# Patient Record
Sex: FEMALE | Race: WHITE | URBAN - METROPOLITAN AREA
[De-identification: names, ages, dates, MRNs, and addresses within clinical notes are randomized per-mention and may not be internally consistent; named-entity substitution may affect disease eponyms.]

---

## 2023-03-21 ENCOUNTER — ANESTHESIA EVENT (OUTPATIENT)
Dept: ENDOSCOPY | Age: 28
End: 2023-03-21
Payer: MEDICAID

## 2023-03-21 ENCOUNTER — HOSPITAL ENCOUNTER (INPATIENT)
Age: 28
LOS: 4 days | Discharge: HOME | DRG: 263 | End: 2023-03-25
Attending: STUDENT IN AN ORGANIZED HEALTH CARE EDUCATION/TRAINING PROGRAM | Admitting: STUDENT IN AN ORGANIZED HEALTH CARE EDUCATION/TRAINING PROGRAM
Payer: MEDICAID

## 2023-03-21 ENCOUNTER — APPOINTMENT (OUTPATIENT)
Dept: CT IMAGING | Age: 28
DRG: 263 | End: 2023-03-21
Attending: STUDENT IN AN ORGANIZED HEALTH CARE EDUCATION/TRAINING PROGRAM
Payer: MEDICAID

## 2023-03-21 ENCOUNTER — ANESTHESIA (OUTPATIENT)
Dept: ENDOSCOPY | Age: 28
End: 2023-03-21
Payer: MEDICAID

## 2023-03-21 DIAGNOSIS — K81.0 ACUTE CHOLECYSTITIS: ICD-10-CM

## 2023-03-21 DIAGNOSIS — K83.8 BILIARY SLUDGE: Primary | ICD-10-CM

## 2023-03-21 DIAGNOSIS — K31.1 GASTRIC OUTLET OBSTRUCTION: ICD-10-CM

## 2023-03-21 LAB
ALBUMIN SERPL-MCNC: 3.4 G/DL (ref 3.4–5)
ALP SERPL-CCNC: 62 U/L (ref 40–129)
ALT SERPL-CCNC: 20 U/L (ref 10–40)
ANION GAP SERPL CALCULATED.3IONS-SCNC: 6 MMOL/L (ref 3–16)
AST SERPL-CCNC: 24 U/L (ref 15–37)
BASOPHILS # BLD: 0 K/UL (ref 0–0.2)
BASOPHILS NFR BLD: 0.5 %
BILIRUB DIRECT SERPL-MCNC: <0.2 MG/DL (ref 0–0.3)
BILIRUB INDIRECT SERPL-MCNC: ABNORMAL MG/DL (ref 0–1)
BILIRUB SERPL-MCNC: 0.5 MG/DL (ref 0–1)
BUN SERPL-MCNC: 12 MG/DL (ref 7–20)
CALCIUM SERPL-MCNC: 7.8 MG/DL (ref 8.3–10.6)
CHLORIDE SERPL-SCNC: 109 MMOL/L (ref 99–110)
CO2 SERPL-SCNC: 22 MMOL/L (ref 21–32)
CREAT SERPL-MCNC: 0.6 MG/DL (ref 0.6–1.1)
DEPRECATED RDW RBC AUTO: 13.4 % (ref 12.4–15.4)
EOSINOPHIL # BLD: 0.2 K/UL (ref 0–0.6)
EOSINOPHIL NFR BLD: 4 %
GFR SERPLBLD CREATININE-BSD FMLA CKD-EPI: >60 ML/MIN/{1.73_M2}
GLUCOSE SERPL-MCNC: 87 MG/DL (ref 70–99)
HCG UR QL: NEGATIVE
HCT VFR BLD AUTO: 36.3 % (ref 36–48)
HGB BLD-MCNC: 12.3 G/DL (ref 12–16)
LYMPHOCYTES # BLD: 0.9 K/UL (ref 1–5.1)
LYMPHOCYTES NFR BLD: 18.2 %
MAGNESIUM SERPL-MCNC: 2.1 MG/DL (ref 1.8–2.4)
MCH RBC QN AUTO: 30.7 PG (ref 26–34)
MCHC RBC AUTO-ENTMCNC: 34 G/DL (ref 31–36)
MCV RBC AUTO: 90.3 FL (ref 80–100)
MONOCYTES # BLD: 0.3 K/UL (ref 0–1.3)
MONOCYTES NFR BLD: 7 %
NEUTROPHILS # BLD: 3.5 K/UL (ref 1.7–7.7)
NEUTROPHILS NFR BLD: 70.3 %
PHOSPHATE SERPL-MCNC: 2.6 MG/DL (ref 2.5–4.9)
PLATELET # BLD AUTO: 136 K/UL (ref 135–450)
PMV BLD AUTO: 8.5 FL (ref 5–10.5)
POTASSIUM SERPL-SCNC: 4 MMOL/L (ref 3.5–5.1)
PROT SERPL-MCNC: 5.8 G/DL (ref 6.4–8.2)
RBC # BLD AUTO: 4.02 M/UL (ref 4–5.2)
REASON FOR REJECTION: NORMAL
REJECTED TEST: NORMAL
SODIUM SERPL-SCNC: 137 MMOL/L (ref 136–145)
TSH SERPL DL<=0.005 MIU/L-ACNC: 0.39 UIU/ML (ref 0.27–4.2)
WBC # BLD AUTO: 5 K/UL (ref 4–11)

## 2023-03-21 PROCEDURE — 2500000003 HC RX 250 WO HCPCS: Performed by: NURSE ANESTHETIST, CERTIFIED REGISTERED

## 2023-03-21 PROCEDURE — 6360000002 HC RX W HCPCS: Performed by: STUDENT IN AN ORGANIZED HEALTH CARE EDUCATION/TRAINING PROGRAM

## 2023-03-21 PROCEDURE — 74176 CT ABD & PELVIS W/O CONTRAST: CPT

## 2023-03-21 PROCEDURE — 80069 RENAL FUNCTION PANEL: CPT

## 2023-03-21 PROCEDURE — 0DB58ZX EXCISION OF ESOPHAGUS, VIA NATURAL OR ARTIFICIAL OPENING ENDOSCOPIC, DIAGNOSTIC: ICD-10-PCS | Performed by: INTERNAL MEDICINE

## 2023-03-21 PROCEDURE — 80076 HEPATIC FUNCTION PANEL: CPT

## 2023-03-21 PROCEDURE — 84703 CHORIONIC GONADOTROPIN ASSAY: CPT

## 2023-03-21 PROCEDURE — 88305 TISSUE EXAM BY PATHOLOGIST: CPT

## 2023-03-21 PROCEDURE — 84443 ASSAY THYROID STIM HORMONE: CPT

## 2023-03-21 PROCEDURE — 7100000000 HC PACU RECOVERY - FIRST 15 MIN: Performed by: INTERNAL MEDICINE

## 2023-03-21 PROCEDURE — 2580000003 HC RX 258: Performed by: STUDENT IN AN ORGANIZED HEALTH CARE EDUCATION/TRAINING PROGRAM

## 2023-03-21 PROCEDURE — 6360000002 HC RX W HCPCS: Performed by: NURSE ANESTHETIST, CERTIFIED REGISTERED

## 2023-03-21 PROCEDURE — 1200000000 HC SEMI PRIVATE

## 2023-03-21 PROCEDURE — 2709999900 HC NON-CHARGEABLE SUPPLY: Performed by: INTERNAL MEDICINE

## 2023-03-21 PROCEDURE — 0DB98ZX EXCISION OF DUODENUM, VIA NATURAL OR ARTIFICIAL OPENING ENDOSCOPIC, DIAGNOSTIC: ICD-10-PCS | Performed by: INTERNAL MEDICINE

## 2023-03-21 PROCEDURE — 83735 ASSAY OF MAGNESIUM: CPT

## 2023-03-21 PROCEDURE — 0DB68ZX EXCISION OF STOMACH, VIA NATURAL OR ARTIFICIAL OPENING ENDOSCOPIC, DIAGNOSTIC: ICD-10-PCS | Performed by: INTERNAL MEDICINE

## 2023-03-21 PROCEDURE — 36415 COLL VENOUS BLD VENIPUNCTURE: CPT

## 2023-03-21 PROCEDURE — 6370000000 HC RX 637 (ALT 250 FOR IP): Performed by: INTERNAL MEDICINE

## 2023-03-21 PROCEDURE — C9113 INJ PANTOPRAZOLE SODIUM, VIA: HCPCS | Performed by: STUDENT IN AN ORGANIZED HEALTH CARE EDUCATION/TRAINING PROGRAM

## 2023-03-21 PROCEDURE — 85025 COMPLETE CBC W/AUTO DIFF WBC: CPT

## 2023-03-21 PROCEDURE — 3700000001 HC ADD 15 MINUTES (ANESTHESIA): Performed by: INTERNAL MEDICINE

## 2023-03-21 PROCEDURE — 3609012400 HC EGD TRANSORAL BIOPSY SINGLE/MULTIPLE: Performed by: INTERNAL MEDICINE

## 2023-03-21 PROCEDURE — 7100000001 HC PACU RECOVERY - ADDTL 15 MIN: Performed by: INTERNAL MEDICINE

## 2023-03-21 PROCEDURE — 2580000003 HC RX 258: Performed by: ANESTHESIOLOGY

## 2023-03-21 PROCEDURE — 3700000000 HC ANESTHESIA ATTENDED CARE: Performed by: INTERNAL MEDICINE

## 2023-03-21 RX ORDER — RIMEGEPANT SULFATE 75 MG/75MG
75 TABLET, ORALLY DISINTEGRATING ORAL
COMMUNITY
Start: 2023-02-01

## 2023-03-21 RX ORDER — SODIUM CHLORIDE 0.9 % (FLUSH) 0.9 %
5-40 SYRINGE (ML) INJECTION EVERY 12 HOURS SCHEDULED
Status: DISCONTINUED | OUTPATIENT
Start: 2023-03-21 | End: 2023-03-25 | Stop reason: HOSPADM

## 2023-03-21 RX ORDER — ACETAMINOPHEN 325 MG/1
650 TABLET ORAL EVERY 6 HOURS PRN
Status: DISCONTINUED | OUTPATIENT
Start: 2023-03-21 | End: 2023-03-24

## 2023-03-21 RX ORDER — DEXAMETHASONE SODIUM PHOSPHATE 4 MG/ML
INJECTION, SOLUTION INTRA-ARTICULAR; INTRALESIONAL; INTRAMUSCULAR; INTRAVENOUS; SOFT TISSUE PRN
Status: DISCONTINUED | OUTPATIENT
Start: 2023-03-21 | End: 2023-03-21 | Stop reason: SDUPTHER

## 2023-03-21 RX ORDER — SODIUM CHLORIDE 9 MG/ML
INJECTION, SOLUTION INTRAVENOUS CONTINUOUS
Status: DISCONTINUED | OUTPATIENT
Start: 2023-03-21 | End: 2023-03-21 | Stop reason: HOSPADM

## 2023-03-21 RX ORDER — POTASSIUM CHLORIDE 20 MEQ/1
40 TABLET, EXTENDED RELEASE ORAL PRN
Status: DISCONTINUED | OUTPATIENT
Start: 2023-03-21 | End: 2023-03-25 | Stop reason: HOSPADM

## 2023-03-21 RX ORDER — POTASSIUM CHLORIDE 7.45 MG/ML
10 INJECTION INTRAVENOUS PRN
Status: DISCONTINUED | OUTPATIENT
Start: 2023-03-21 | End: 2023-03-25 | Stop reason: HOSPADM

## 2023-03-21 RX ORDER — PANTOPRAZOLE SODIUM 40 MG/10ML
40 INJECTION, POWDER, LYOPHILIZED, FOR SOLUTION INTRAVENOUS DAILY
Status: DISCONTINUED | OUTPATIENT
Start: 2023-03-21 | End: 2023-03-21

## 2023-03-21 RX ORDER — ROCURONIUM BROMIDE 10 MG/ML
INJECTION, SOLUTION INTRAVENOUS PRN
Status: DISCONTINUED | OUTPATIENT
Start: 2023-03-21 | End: 2023-03-21 | Stop reason: SDUPTHER

## 2023-03-21 RX ORDER — ENOXAPARIN SODIUM 100 MG/ML
40 INJECTION SUBCUTANEOUS NIGHTLY
Status: DISCONTINUED | OUTPATIENT
Start: 2023-03-21 | End: 2023-03-25 | Stop reason: HOSPADM

## 2023-03-21 RX ORDER — ACETAMINOPHEN 650 MG/1
650 SUPPOSITORY RECTAL EVERY 6 HOURS PRN
Status: DISCONTINUED | OUTPATIENT
Start: 2023-03-21 | End: 2023-03-24

## 2023-03-21 RX ORDER — LIDOCAINE HYDROCHLORIDE 20 MG/ML
INJECTION, SOLUTION INFILTRATION; PERINEURAL PRN
Status: DISCONTINUED | OUTPATIENT
Start: 2023-03-21 | End: 2023-03-21 | Stop reason: SDUPTHER

## 2023-03-21 RX ORDER — SODIUM CHLORIDE 9 MG/ML
INJECTION, SOLUTION INTRAVENOUS PRN
Status: DISCONTINUED | OUTPATIENT
Start: 2023-03-21 | End: 2023-03-25 | Stop reason: HOSPADM

## 2023-03-21 RX ORDER — POLYETHYLENE GLYCOL 3350 17 G/17G
17 POWDER, FOR SOLUTION ORAL DAILY PRN
Status: DISCONTINUED | OUTPATIENT
Start: 2023-03-21 | End: 2023-03-22

## 2023-03-21 RX ORDER — PANTOPRAZOLE SODIUM 40 MG/1
40 TABLET, DELAYED RELEASE ORAL
Status: DISCONTINUED | OUTPATIENT
Start: 2023-03-22 | End: 2023-03-25 | Stop reason: HOSPADM

## 2023-03-21 RX ORDER — SODIUM CHLORIDE, SODIUM LACTATE, POTASSIUM CHLORIDE, CALCIUM CHLORIDE 600; 310; 30; 20 MG/100ML; MG/100ML; MG/100ML; MG/100ML
INJECTION, SOLUTION INTRAVENOUS CONTINUOUS
Status: DISCONTINUED | OUTPATIENT
Start: 2023-03-21 | End: 2023-03-25 | Stop reason: HOSPADM

## 2023-03-21 RX ORDER — ONDANSETRON 4 MG/1
4 TABLET, ORALLY DISINTEGRATING ORAL EVERY 8 HOURS PRN
Status: DISCONTINUED | OUTPATIENT
Start: 2023-03-21 | End: 2023-03-25 | Stop reason: HOSPADM

## 2023-03-21 RX ORDER — MAGNESIUM SULFATE IN WATER 40 MG/ML
2000 INJECTION, SOLUTION INTRAVENOUS PRN
Status: DISCONTINUED | OUTPATIENT
Start: 2023-03-21 | End: 2023-03-25 | Stop reason: HOSPADM

## 2023-03-21 RX ORDER — HYDROMORPHONE HYDROCHLORIDE 1 MG/ML
0.5 INJECTION, SOLUTION INTRAMUSCULAR; INTRAVENOUS; SUBCUTANEOUS
Status: DISCONTINUED | OUTPATIENT
Start: 2023-03-21 | End: 2023-03-24

## 2023-03-21 RX ORDER — ONDANSETRON 2 MG/ML
4 INJECTION INTRAMUSCULAR; INTRAVENOUS EVERY 6 HOURS PRN
Status: DISCONTINUED | OUTPATIENT
Start: 2023-03-21 | End: 2023-03-25 | Stop reason: HOSPADM

## 2023-03-21 RX ORDER — SUCCINYLCHOLINE/SOD CL,ISO/PF 200MG/10ML
SYRINGE (ML) INTRAVENOUS PRN
Status: DISCONTINUED | OUTPATIENT
Start: 2023-03-21 | End: 2023-03-21 | Stop reason: SDUPTHER

## 2023-03-21 RX ORDER — SODIUM CHLORIDE 0.9 % (FLUSH) 0.9 %
5-40 SYRINGE (ML) INJECTION PRN
Status: DISCONTINUED | OUTPATIENT
Start: 2023-03-21 | End: 2023-03-25 | Stop reason: HOSPADM

## 2023-03-21 RX ORDER — PROPOFOL 10 MG/ML
INJECTION, EMULSION INTRAVENOUS PRN
Status: DISCONTINUED | OUTPATIENT
Start: 2023-03-21 | End: 2023-03-21 | Stop reason: SDUPTHER

## 2023-03-21 RX ORDER — NICOTINE 21 MG/24HR
1 PATCH, TRANSDERMAL 24 HOURS TRANSDERMAL DAILY
Status: DISCONTINUED | OUTPATIENT
Start: 2023-03-21 | End: 2023-03-25 | Stop reason: HOSPADM

## 2023-03-21 RX ORDER — ONDANSETRON 2 MG/ML
INJECTION INTRAMUSCULAR; INTRAVENOUS PRN
Status: DISCONTINUED | OUTPATIENT
Start: 2023-03-21 | End: 2023-03-21 | Stop reason: SDUPTHER

## 2023-03-21 RX ORDER — METOCLOPRAMIDE 10 MG/1
10 TABLET ORAL 2 TIMES DAILY
Status: DISCONTINUED | OUTPATIENT
Start: 2023-03-21 | End: 2023-03-25 | Stop reason: HOSPADM

## 2023-03-21 RX ORDER — ESMOLOL HYDROCHLORIDE 10 MG/ML
INJECTION INTRAVENOUS PRN
Status: DISCONTINUED | OUTPATIENT
Start: 2023-03-21 | End: 2023-03-21 | Stop reason: SDUPTHER

## 2023-03-21 RX ADMIN — ROCURONIUM BROMIDE 5 MG: 10 INJECTION INTRAVENOUS at 14:55

## 2023-03-21 RX ADMIN — ONDANSETRON 4 MG: 2 INJECTION INTRAMUSCULAR; INTRAVENOUS at 18:08

## 2023-03-21 RX ADMIN — LIDOCAINE HYDROCHLORIDE 100 MG: 20 INJECTION, SOLUTION INFILTRATION; PERINEURAL at 14:55

## 2023-03-21 RX ADMIN — ONDANSETRON 4 MG: 2 INJECTION INTRAMUSCULAR; INTRAVENOUS at 15:05

## 2023-03-21 RX ADMIN — SODIUM CHLORIDE, PRESERVATIVE FREE 10 ML: 5 INJECTION INTRAVENOUS at 20:58

## 2023-03-21 RX ADMIN — SODIUM CHLORIDE, POTASSIUM CHLORIDE, SODIUM LACTATE AND CALCIUM CHLORIDE: 600; 310; 30; 20 INJECTION, SOLUTION INTRAVENOUS at 06:39

## 2023-03-21 RX ADMIN — HYDROMORPHONE HYDROCHLORIDE 0.5 MG: 1 INJECTION, SOLUTION INTRAMUSCULAR; INTRAVENOUS; SUBCUTANEOUS at 17:40

## 2023-03-21 RX ADMIN — PROPOFOL 200 MG: 10 INJECTION, EMULSION INTRAVENOUS at 14:55

## 2023-03-21 RX ADMIN — SODIUM CHLORIDE: 9 INJECTION, SOLUTION INTRAVENOUS at 13:39

## 2023-03-21 RX ADMIN — DEXAMETHASONE SODIUM PHOSPHATE 4 MG: 4 INJECTION, SOLUTION INTRAMUSCULAR; INTRAVENOUS at 15:05

## 2023-03-21 RX ADMIN — ESMOLOL HYDROCHLORIDE 10 MG: 10 INJECTION, SOLUTION INTRAVENOUS at 15:00

## 2023-03-21 RX ADMIN — SODIUM CHLORIDE, PRESERVATIVE FREE 10 ML: 5 INJECTION INTRAVENOUS at 09:45

## 2023-03-21 RX ADMIN — Medication 140 MG: at 14:57

## 2023-03-21 RX ADMIN — HYDROMORPHONE HYDROCHLORIDE 0.5 MG: 1 INJECTION, SOLUTION INTRAMUSCULAR; INTRAVENOUS; SUBCUTANEOUS at 09:56

## 2023-03-21 RX ADMIN — SODIUM CHLORIDE, PRESERVATIVE FREE 10 ML: 5 INJECTION INTRAVENOUS at 06:38

## 2023-03-21 RX ADMIN — HYDROMORPHONE HYDROCHLORIDE 0.5 MG: 1 INJECTION, SOLUTION INTRAMUSCULAR; INTRAVENOUS; SUBCUTANEOUS at 20:58

## 2023-03-21 RX ADMIN — PANTOPRAZOLE SODIUM 40 MG: 40 INJECTION, POWDER, FOR SOLUTION INTRAVENOUS at 09:44

## 2023-03-21 RX ADMIN — ESMOLOL HYDROCHLORIDE 10 MG: 10 INJECTION, SOLUTION INTRAVENOUS at 14:55

## 2023-03-21 RX ADMIN — METOCLOPRAMIDE 10 MG: 10 TABLET ORAL at 20:57

## 2023-03-21 RX ADMIN — ENOXAPARIN SODIUM 40 MG: 100 INJECTION SUBCUTANEOUS at 20:57

## 2023-03-21 RX ADMIN — HYDROMORPHONE HYDROCHLORIDE 0.5 MG: 1 INJECTION, SOLUTION INTRAMUSCULAR; INTRAVENOUS; SUBCUTANEOUS at 06:46

## 2023-03-21 ASSESSMENT — PAIN SCALES - GENERAL
PAINLEVEL_OUTOF10: 8
PAINLEVEL_OUTOF10: 8
PAINLEVEL_OUTOF10: 7
PAINLEVEL_OUTOF10: 0
PAINLEVEL_OUTOF10: 7
PAINLEVEL_OUTOF10: 8

## 2023-03-21 ASSESSMENT — PAIN DESCRIPTION - LOCATION
LOCATION: ABDOMEN

## 2023-03-21 ASSESSMENT — PAIN DESCRIPTION - DESCRIPTORS
DESCRIPTORS: DISCOMFORT;CRAMPING
DESCRIPTORS: CRAMPING
DESCRIPTORS: CRAMPING;STABBING
DESCRIPTORS: CRAMPING

## 2023-03-21 ASSESSMENT — PAIN - FUNCTIONAL ASSESSMENT
PAIN_FUNCTIONAL_ASSESSMENT: 0-10
PAIN_FUNCTIONAL_ASSESSMENT: ACTIVITIES ARE NOT PREVENTED
PAIN_FUNCTIONAL_ASSESSMENT: PREVENTS OR INTERFERES SOME ACTIVE ACTIVITIES AND ADLS

## 2023-03-21 ASSESSMENT — PAIN DESCRIPTION - ORIENTATION
ORIENTATION: LOWER
ORIENTATION: RIGHT;LEFT;MID
ORIENTATION: LEFT;LOWER

## 2023-03-21 ASSESSMENT — LIFESTYLE VARIABLES: SMOKING_STATUS: 1

## 2023-03-21 NOTE — H&P
History:      TOBACCO:   reports that she has been smoking. She has been smoking an average of .25 packs per day. She does not have any smokeless tobacco history on file. ETOH:   reports no history of alcohol use. E-cigarette/Vaping       Questions Responses    E-cigarette/Vaping Use     Start Date     Passive Exposure     Quit Date     Counseling Given     Comments               Family History:      Reviewed and negative in regards to presenting illness/complaint. No family history on file. REVIEW OF SYSTEMS COMPLETED:   Pertinent positives as noted in the HPI. All other systems reviewed and negative. PHYSICAL EXAM PERFORMED:    /78   Pulse 83   Temp 97.3 °F (36.3 °C) (Oral)   Resp 16   SpO2 95%     General appearance:  No apparent distress, appears stated age and cooperative. HEENT:  Normal cephalic, atraumatic without obvious deformity. Pupils equal, round, and reactive to light. Extra ocular muscles intact. Conjunctivae/corneas clear. Neck: Supple, with full range of motion. No jugular venous distention. Trachea midline. Respiratory:  Normal respiratory effort. Clear to auscultation, bilaterally without Rales/Wheezes/Rhonchi. Cardiovascular:  Regular rate and rhythm with normal S1/S2 without murmurs, rubs or gallops. No peripheral edema. Abdomen: Soft, non-distended with normal bowel sounds. Moderate tenderness to palpation diffusely. : No CVA tenderness  Musculoskeletal:  No clubbing or cyanosis. Full range of motion without deformity. Skin: Skin color, texture, turgor normal.  No rashes or lesions. Neurologic:  Neurovascularly intact without any focal sensory/motor deficits.  Cranial nerves: II-XII intact, grossly non-focal.  Psychiatric:  Alert and oriented, thought content appropriate, normal insight  Capillary Refill: Brisk, 3 seconds, normal   Peripheral Pulses: +2 palpable, equal bilaterally       Labs:     No results for input(s): WBC, HGB, HCT, PLT in the last 72 acid (EFFER-K) effervescent tablet 40 mEq  40 mEq Oral PRN Everlena Nikki, DO        Or    potassium chloride 10 mEq/100 mL IVPB (Peripheral Line)  10 mEq IntraVENous PRN Everlena Nikki, DO           Consults:    IP CONSULT TO GI    ASSESSMENT:    Active Hospital Problems    Diagnosis Date Noted    Gastric outlet obstruction [K31.1] 03/21/2023     Priority: Medium         PLAN:  This is a 32 y.o. female who presented to [facility] and is being treated for:    Gastric outlet obstruction  -Imaging from [facility] ED indicated gastric outlet obstruction  -CT abdomen pelvis ordered  -Labs ordered  -IVF  -N.p.o.  -Defer NG placement to GI  -Pain control with IV pain meds  -GI consulted; appreciate recommendations        DVT ppx: Lovenox  GI ppx: PPI  Diet: Diet NPO  Code Status: Full Code    PT/OT Eval Status: Not ordered    Disposition - home after medical stabilization and treatment       Guillermo Jordan DO  3/21/2023  6:18 AM

## 2023-03-21 NOTE — H&P
Gastroenterology Note             Pre-operative History and Physical    Patient: Avery Black  : 1995  CSN:     History Obtained From:  patient and/or guardian. HISTORY OF PRESENT ILLNESS:    The patient is a 32 y.o. female  here for EGD  Very pleasant 18-year-old female who was sent in today from [facility] she had a CT scan which showed she had a dilated stomach with food and liquid in it redoing an upper endoscopy to find out if she has a gastric outlet obstruction    Past Medical History:    History reviewed. No pertinent past medical history. Past Surgical History:    Past Surgical History:   Procedure Laterality Date    CYST REMOVAL      from eye lid    DILATION AND CURETTAGE OF UTERUS  3/21/12     Medications Prior to Admission:   No current facility-administered medications on file prior to encounter. Current Outpatient Medications on File Prior to Encounter   Medication Sig Dispense Refill    NURTEC 75 MG TBDP Take 75 mg by mouth Pt reports taking this as needed. Last time she took it was about 1 month ago. Allergies:  Morphine, Cephalexin, Ibuprofen, and Pcn [penicillins]      Social History:   Social History     Tobacco Use    Smoking status: Every Day     Packs/day: 0.25     Types: Cigarettes    Smokeless tobacco: Not on file   Substance Use Topics    Alcohol use: No     Family History:   History reviewed. No pertinent family history. PHYSICAL EXAM:      /83   Pulse 91   Temp 97 °F (36.1 °C) (Temporal)   Resp 18   Ht 5' 2\" (1.575 m)   Wt 173 lb (78.5 kg)   LMP 2023 (Approximate)   SpO2 98%   BMI 31.64 kg/m²  I        Heart:   RRR, normal s1s2    Lungs:  CTA bilat,  Normal effort    Abdomen:   NT, ND      ASA Grade:  ASA 2 - Patient with mild systemic disease with no functional limitations    Mallampati Class: 2          ASSESSMENT AND PLAN:    1.   Patient is a 32 y.o. female here for EGD with MAC.   2.  Procedure options, risks and benefits reviewed with patient. Patient expresses understanding.     Ирина Zuñiga MD,   [address]  3/21/2023

## 2023-03-21 NOTE — CARE COORDINATION
Discharge Planning Note:    Chart reviewed and it appears that patient has minimal needs for discharge at this time. Discussed with patient and requested that case management be notified if discharge needs are identified.     - Current discharge plan is for the patient to return home. Case management will continue to follow progress and update discharge plan as needed.       Risk of Readmission Score: 5%    RUPESH Mclaughlin RN    [facility]  Phone: [phone]

## 2023-03-21 NOTE — PROGRESS NOTES
Morning assessment completed. Pt comfortably resting in bed. VSS. Morning meds given per MAR. The care plan and education has been reviewed and mutually agreed upon with the patient. Pt complain of pain, prn dilaudid given.

## 2023-03-21 NOTE — ANESTHESIA PRE PROCEDURE
4.02 03/21/2023 06:33 AM    HGB 12.3 03/21/2023 06:33 AM    HCT 36.3 03/21/2023 06:33 AM    MCV 90.3 03/21/2023 06:33 AM    RDW 13.4 03/21/2023 06:33 AM     03/21/2023 06:33 AM       CMP:   Lab Results   Component Value Date/Time     03/21/2023 06:33 AM    K 4.0 03/21/2023 06:33 AM     03/21/2023 06:33 AM    CO2 22 03/21/2023 06:33 AM    BUN 12 03/21/2023 06:33 AM    CREATININE 0.6 03/21/2023 06:33 AM    GFRAA >60 03/02/2012 04:09 PM    AGRATIO 1.8 03/02/2012 04:09 PM    LABGLOM >60 03/21/2023 06:33 AM    GLUCOSE 87 03/21/2023 06:33 AM    PROT 5.8 03/21/2023 06:33 AM    PROT 7.8 03/02/2012 04:09 PM    CALCIUM 7.8 03/21/2023 06:33 AM    BILITOT 0.5 03/21/2023 06:33 AM    ALKPHOS 62 03/21/2023 06:33 AM    AST 24 03/21/2023 06:33 AM    ALT 20 03/21/2023 06:33 AM       POC Tests: No results for input(s): POCGLU, POCNA, POCK, POCCL, POCBUN, POCHEMO, POCHCT in the last 72 hours.     Coags: No results found for: PROTIME, INR, APTT    HCG (If Applicable):   Lab Results   Component Value Date    PREGTESTUR Negative 03/21/2023        ABGs: No results found for: PHART, PO2ART, JPU1ABE, OAP5JYX, BEART, D7YOQCHC     Type & Screen (If Applicable):  Lab Results   Component Value Date    LABABO O 03/21/2012    [address] Positive 03/21/2012       Drug/Infectious Status (If Applicable):  No results found for: HIV, HEPCAB    COVID-19 Screening (If Applicable): No results found for: COVID19        Anesthesia Evaluation  Patient summary reviewed and Nursing notes reviewed no history of anesthetic complications:   Airway: Mallampati: II  TM distance: >3 FB   Neck ROM: full  Mouth opening: > = 3 FB   Dental: normal exam         Pulmonary:normal exam  breath sounds clear to auscultation  (+) current smoker    (-) asthma and sleep apnea                           Cardiovascular:Negative CV ROS  Exercise tolerance: good (>4 METS),       (-) hypertension      Rhythm: regular  Rate: normal                    Neuro/Psych:

## 2023-03-21 NOTE — ANESTHESIA POSTPROCEDURE EVALUATION
Department of Anesthesiology  Postprocedure Note    Patient: Sarmad Bolivar  MRN: [id number]  YOB: 1995  Date of evaluation: 3/21/2023      Procedure Summary     Date: 03/21/23 Room / Location: [address] [place] [address]    Anesthesia Start: 9064 Anesthesia Stop: 4353    Procedure: EGD BIOPSY (Abdomen) Diagnosis:       Gastric outlet obstruction      (Gastric outlet obstruction [K31.1])    Surgeons: Sandra Angela MD Responsible Provider: Lear Gosselin, MD    Anesthesia Type: general ASA Status: 2          Anesthesia Type: No value filed.     Keren Phase I: Keren Score: 5    Keren Phase II:        Anesthesia Post Evaluation    Patient location during evaluation: PACU  Patient participation: complete - patient participated  Level of consciousness: awake and alert  Airway patency: patent  Nausea & Vomiting: no vomiting and no nausea  Complications: no  Cardiovascular status: hemodynamically stable  Respiratory status: acceptable  Hydration status: stable

## 2023-03-21 NOTE — PROGRESS NOTES
Patient admitted after midnight with gastric outlet obstruction. Has had chronic abdominal pain. She has been nauseated after eating for a few month. She has 4 kids age 1-12 (has been pregnant 7 times). She works at [employer]. Boyfriend lives with her and the children. She reports irrregular periods. She has also been constipated requiring laxatives. Had small pebble like BM yesterday but cannot remember BM prior to that. Currently rates pain 7/10. Abdomen soft, mildly tender in upper abdomen and LLQ     CT abd with iv contrast at [address] [place] [address] Severely distended stomach containing food and air-suggestive of GOO, Has left sided varicocele-previous left ovarian vein embolization. GI on board. Planning for EGD today.        Danelle Eisenberg PA-C

## 2023-03-21 NOTE — PROGRESS NOTES
Urine sample collected and sent to lab. Consent signed by the pt, witnessed the signature. Pt complain of dry mouth, mouth swab provided. 1250 - urine sample re-sent. 1310 - pt left the floor for EGD    1555 - pt returned back to the room. VSS.

## 2023-03-21 NOTE — CONSULTS
temperature 97.3 °F (36.3 °C), temperature source Oral, resp. rate 16, SpO2 95 %. General appearance: alert, cooperative, no distress, appears stated age  Eyes: Anicteric  Head: Normocephalic, without obvious abnormality  Lungs: clear to auscultation bilaterally, Normal Effort  Heart: regular rate and rhythm, normal S1 and S2, no murmurs or rubs  Abdomen: soft, mid epigastric tenderness. Bowel sounds normal. No masses,  no organomegaly. Extremities: atraumatic, no cyanosis or edema  Skin: warm and dry, no jaundice  Neuro: Grossly intact, A&OX3  Musculoskeletal: 5/5  strength BUE      Data Review:    Recent Labs     03/21/23  0633   WBC 5.0   HGB 12.3   HCT 36.3   MCV 90.3        Recent Labs     03/21/23  0633      K 4.0      CO2 22   PHOS 2.6   BUN 12   CREATININE 0.6     Recent Labs     03/21/23  0633   AST 24   ALT 20   BILIDIR <0.2   BILITOT 0.5   ALKPHOS 62     No results for input(s): LIPASE, AMYLASE in the last 72 hours. No results for input(s): PROTIME, INR in the last 72 hours. No results for input(s): PTT in the last 72 hours. No results for input(s): OCCULTBLD in the last 72 hours. Imaging Studies:    CT abd/pelvis with IV contrast 3/20/23 at [facility] with massive distention of the stomach with food material concerning for GOO. Assessment/Plan:     GOO - pt has had postprandial fullness for several months and epigastric pain for one month.  CT abd/pelvis with IV contrast 3/20/23 at [facility] with massive distention of the stomach with food material concerning for GOO.    - NPO  - PPI IV  - EGD today    Discussed with Dr. Chen Allen, [address] [place] [address]

## 2023-03-21 NOTE — PROGRESS NOTES
Pt arrived from endo, report from crna/rn. Pt had EGD with biopsies obtained, abdomen soft and rounded. Pt respirations even unlabored, oral airway and nasal cannula 4 liters in place. Pt asleep non responsive upon arrival, semi fowlers position. Consent: The patient's consent was obtained including but not limited to risks of crusting, scabbing, blistering, scarring, darker or lighter pigmentary change, recurrence, incomplete removal and infection. Show Aperture Variable?: Yes Render Note In Bullet Format When Appropriate: No Post-Care Instructions: I reviewed with the patient in detail post-care instructions. Patient is to wear sunprotection, and avoid picking at any of the treated lesions. Pt may apply Vaseline to crusted or scabbing areas. Duration Of Freeze Thaw-Cycle (Seconds): 0 Detail Level: Detailed

## 2023-03-21 NOTE — PROCEDURES
Endoscopy Note    Patient: Renaldo Mejia  : 1995  CSN:     Procedure: Esophagogastroduodenoscopy with biopsy    Date:  3/21/2023     Surgeon:  Addy Shelton MD     Referring Physician:      Preoperative Diagnosis: Abnormal CT scan    Postoperative Diagnosis: 1 there is a fine white material in the esophagus we did biopsy for EOE  #2 small hiatal hernia 1 cm or less  #3 gastritis without any ulcers biopsy  #4 no evidence of a gastric outlet obstruction #5 patient had the small denuded areas in the small bowel that I did biopsy      Anesthesia: General anesthesia    EBL: <5 mL    Indications: This is a 32y.o. year old female who transferred from [facility] today because she admitted there for persistent nausea vomiting she had a CT scan which revealed a dilated stomach she was sent here because there is a question whether or not she had a gastric outlet obstruction      Description of Procedure:  Informed consent was obtained from the patient after explanation of indications, benefits and possible risks and complications of the procedure. The patient was then taken to the endoscopy suite, placed in the left lateral decubitus position and the above IV sedation was administrered.     The Olympus videoendoscope was passed through the hypopharynx     Posterior pharynx was normal    Esophagus was normal but there was a supine white looking material all up and down the esophagus sometimes you can see this with EOE so we did take a biopsy    Hiatal hernia was 1 cm or less    Stomach reviewed and no contractions of the stomach there was an inflammation of the stomach but there were no ulcers or ulcerations we did take a biopsy for H. Pylori    Duodenum there were the small duodenum denuded areas in the duodenum I did biopsy    Reflection did show the small hiatal hernia      Gastric or Duodenal ulcer present: No      The patient tolerated the procedure well and was taken to the post anesthesia care unit in good condition.       Impression: Patient does not have a gastric outlet obstruction  Patient may have a slow stomach  There is the possibility that she has a gastritis and a the eosinophilic esophagitis will need to wait the biopsies      Recommendations: Short-term she will need a proton pump inhibitor and a prokinetic agent    And we need to follow-up on her biopsy    Thank you for this very kind referral    Brenna Goff MD, MD  [address] [facility]  [phone]

## 2023-03-22 PROBLEM — R10.11 RUQ ABDOMINAL PAIN: Status: ACTIVE | Noted: 2023-03-22

## 2023-03-22 PROBLEM — K83.8 BILIARY SLUDGE: Status: ACTIVE | Noted: 2023-03-22

## 2023-03-22 LAB
ALBUMIN SERPL-MCNC: 3.4 G/DL (ref 3.4–5)
ALP SERPL-CCNC: 56 U/L (ref 40–129)
ALT SERPL-CCNC: 22 U/L (ref 10–40)
ANION GAP SERPL CALCULATED.3IONS-SCNC: 8 MMOL/L (ref 3–16)
AST SERPL-CCNC: 25 U/L (ref 15–37)
BASOPHILS # BLD: 0 K/UL (ref 0–0.2)
BASOPHILS NFR BLD: 0.1 %
BILIRUB DIRECT SERPL-MCNC: <0.2 MG/DL (ref 0–0.3)
BILIRUB INDIRECT SERPL-MCNC: ABNORMAL MG/DL (ref 0–1)
BILIRUB SERPL-MCNC: <0.2 MG/DL (ref 0–1)
BUN SERPL-MCNC: 10 MG/DL (ref 7–20)
CALCIUM SERPL-MCNC: 8 MG/DL (ref 8.3–10.6)
CHLORIDE SERPL-SCNC: 110 MMOL/L (ref 99–110)
CO2 SERPL-SCNC: 23 MMOL/L (ref 21–32)
CREAT SERPL-MCNC: 0.6 MG/DL (ref 0.6–1.1)
DEPRECATED RDW RBC AUTO: 13.2 % (ref 12.4–15.4)
EOSINOPHIL # BLD: 0 K/UL (ref 0–0.6)
EOSINOPHIL NFR BLD: 0.1 %
GFR SERPLBLD CREATININE-BSD FMLA CKD-EPI: >60 ML/MIN/{1.73_M2}
GLUCOSE SERPL-MCNC: 136 MG/DL (ref 70–99)
HCT VFR BLD AUTO: 35.4 % (ref 36–48)
HGB BLD-MCNC: 11.9 G/DL (ref 12–16)
LYMPHOCYTES # BLD: 0.7 K/UL (ref 1–5.1)
LYMPHOCYTES NFR BLD: 11.2 %
MAGNESIUM SERPL-MCNC: 1.9 MG/DL (ref 1.8–2.4)
MCH RBC QN AUTO: 30.2 PG (ref 26–34)
MCHC RBC AUTO-ENTMCNC: 33.7 G/DL (ref 31–36)
MCV RBC AUTO: 89.5 FL (ref 80–100)
MONOCYTES # BLD: 0.3 K/UL (ref 0–1.3)
MONOCYTES NFR BLD: 4.6 %
NEUTROPHILS # BLD: 5.5 K/UL (ref 1.7–7.7)
NEUTROPHILS NFR BLD: 84 %
PHOSPHATE SERPL-MCNC: 2.6 MG/DL (ref 2.5–4.9)
PLATELET # BLD AUTO: 160 K/UL (ref 135–450)
PMV BLD AUTO: 8.7 FL (ref 5–10.5)
POTASSIUM SERPL-SCNC: 3.9 MMOL/L (ref 3.5–5.1)
PROT SERPL-MCNC: 5.7 G/DL (ref 6.4–8.2)
RBC # BLD AUTO: 3.95 M/UL (ref 4–5.2)
SODIUM SERPL-SCNC: 141 MMOL/L (ref 136–145)
WBC # BLD AUTO: 6.5 K/UL (ref 4–11)

## 2023-03-22 PROCEDURE — 1200000000 HC SEMI PRIVATE

## 2023-03-22 PROCEDURE — 6370000000 HC RX 637 (ALT 250 FOR IP): Performed by: PHYSICIAN ASSISTANT

## 2023-03-22 PROCEDURE — 80076 HEPATIC FUNCTION PANEL: CPT

## 2023-03-22 PROCEDURE — 80069 RENAL FUNCTION PANEL: CPT

## 2023-03-22 PROCEDURE — APPNB30 APP NON BILLABLE TIME 0-30 MINS: Performed by: NURSE PRACTITIONER

## 2023-03-22 PROCEDURE — 85025 COMPLETE CBC W/AUTO DIFF WBC: CPT

## 2023-03-22 PROCEDURE — 83735 ASSAY OF MAGNESIUM: CPT

## 2023-03-22 PROCEDURE — 6360000002 HC RX W HCPCS: Performed by: STUDENT IN AN ORGANIZED HEALTH CARE EDUCATION/TRAINING PROGRAM

## 2023-03-22 PROCEDURE — 6370000000 HC RX 637 (ALT 250 FOR IP): Performed by: INTERNAL MEDICINE

## 2023-03-22 PROCEDURE — 2580000003 HC RX 258: Performed by: STUDENT IN AN ORGANIZED HEALTH CARE EDUCATION/TRAINING PROGRAM

## 2023-03-22 PROCEDURE — 99222 1ST HOSP IP/OBS MODERATE 55: CPT | Performed by: SURGERY

## 2023-03-22 RX ORDER — DICYCLOMINE HYDROCHLORIDE 10 MG/1
10 CAPSULE ORAL EVERY 12 HOURS
Status: DISCONTINUED | OUTPATIENT
Start: 2023-03-22 | End: 2023-03-25 | Stop reason: HOSPADM

## 2023-03-22 RX ORDER — LACTULOSE 10 G/15ML
20 SOLUTION ORAL
Status: COMPLETED | OUTPATIENT
Start: 2023-03-22 | End: 2023-03-22

## 2023-03-22 RX ORDER — POLYETHYLENE GLYCOL 3350 17 G/17G
17 POWDER, FOR SOLUTION ORAL 2 TIMES DAILY
Status: DISCONTINUED | OUTPATIENT
Start: 2023-03-22 | End: 2023-03-25 | Stop reason: HOSPADM

## 2023-03-22 RX ADMIN — SODIUM CHLORIDE, POTASSIUM CHLORIDE, SODIUM LACTATE AND CALCIUM CHLORIDE: 600; 310; 30; 20 INJECTION, SOLUTION INTRAVENOUS at 16:28

## 2023-03-22 RX ADMIN — SODIUM CHLORIDE, PRESERVATIVE FREE 10 ML: 5 INJECTION INTRAVENOUS at 08:52

## 2023-03-22 RX ADMIN — Medication 240 ML: at 15:03

## 2023-03-22 RX ADMIN — HYDROMORPHONE HYDROCHLORIDE 0.5 MG: 1 INJECTION, SOLUTION INTRAMUSCULAR; INTRAVENOUS; SUBCUTANEOUS at 12:17

## 2023-03-22 RX ADMIN — HYDROMORPHONE HYDROCHLORIDE 0.5 MG: 1 INJECTION, SOLUTION INTRAMUSCULAR; INTRAVENOUS; SUBCUTANEOUS at 20:49

## 2023-03-22 RX ADMIN — METOCLOPRAMIDE 10 MG: 10 TABLET ORAL at 08:51

## 2023-03-22 RX ADMIN — ONDANSETRON 4 MG: 2 INJECTION INTRAMUSCULAR; INTRAVENOUS at 16:20

## 2023-03-22 RX ADMIN — POLYETHYLENE GLYCOL 3350 17 G: 17 POWDER, FOR SOLUTION ORAL at 20:44

## 2023-03-22 RX ADMIN — DICYCLOMINE HYDROCHLORIDE 10 MG: 10 CAPSULE ORAL at 20:44

## 2023-03-22 RX ADMIN — LACTULOSE 20 G: 20 SOLUTION ORAL at 20:44

## 2023-03-22 RX ADMIN — POLYETHYLENE GLYCOL 3350 17 G: 17 POWDER, FOR SOLUTION ORAL at 11:26

## 2023-03-22 RX ADMIN — HYDROMORPHONE HYDROCHLORIDE 0.5 MG: 1 INJECTION, SOLUTION INTRAMUSCULAR; INTRAVENOUS; SUBCUTANEOUS at 08:50

## 2023-03-22 RX ADMIN — ENOXAPARIN SODIUM 40 MG: 100 INJECTION SUBCUTANEOUS at 20:44

## 2023-03-22 RX ADMIN — METOCLOPRAMIDE 10 MG: 10 TABLET ORAL at 20:44

## 2023-03-22 RX ADMIN — HYDROMORPHONE HYDROCHLORIDE 0.5 MG: 1 INJECTION, SOLUTION INTRAMUSCULAR; INTRAVENOUS; SUBCUTANEOUS at 16:20

## 2023-03-22 RX ADMIN — PANTOPRAZOLE SODIUM 40 MG: 40 TABLET, DELAYED RELEASE ORAL at 08:51

## 2023-03-22 RX ADMIN — ONDANSETRON 4 MG: 2 INJECTION INTRAMUSCULAR; INTRAVENOUS at 08:51

## 2023-03-22 RX ADMIN — LACTULOSE 20 G: 20 SOLUTION ORAL at 12:13

## 2023-03-22 RX ADMIN — LACTULOSE 20 G: 20 SOLUTION ORAL at 16:19

## 2023-03-22 ASSESSMENT — PAIN SCALES - GENERAL
PAINLEVEL_OUTOF10: 8
PAINLEVEL_OUTOF10: 9
PAINLEVEL_OUTOF10: 8
PAINLEVEL_OUTOF10: 4
PAINLEVEL_OUTOF10: 4
PAINLEVEL_OUTOF10: 3
PAINLEVEL_OUTOF10: 7

## 2023-03-22 ASSESSMENT — PAIN - FUNCTIONAL ASSESSMENT
PAIN_FUNCTIONAL_ASSESSMENT: ACTIVITIES ARE NOT PREVENTED

## 2023-03-22 ASSESSMENT — PAIN DESCRIPTION - DESCRIPTORS
DESCRIPTORS: ACHING
DESCRIPTORS: DISCOMFORT;CRAMPING
DESCRIPTORS: ACHING;DISCOMFORT
DESCRIPTORS: CRAMPING;DISCOMFORT

## 2023-03-22 ASSESSMENT — PAIN DESCRIPTION - ORIENTATION
ORIENTATION: RIGHT;LEFT;MID
ORIENTATION: RIGHT;LEFT;MID
ORIENTATION: RIGHT;LEFT

## 2023-03-22 ASSESSMENT — PAIN DESCRIPTION - FREQUENCY
FREQUENCY: CONTINUOUS
FREQUENCY: INTERMITTENT

## 2023-03-22 ASSESSMENT — PAIN DESCRIPTION - LOCATION
LOCATION: ABDOMEN

## 2023-03-22 ASSESSMENT — PAIN DESCRIPTION - PAIN TYPE
TYPE: ACUTE PAIN
TYPE: ACUTE PAIN

## 2023-03-22 NOTE — PROGRESS NOTES
2032: Assessment complete, VSS, BS active, pt is passing flatus, no BM, abd soft, tolerating regular diet, no nausea, some pain, gave dilaudid, see MAR, discussed care plan, pt mutually agrees, call light and belongings in reach.     Michelle Sims RN

## 2023-03-22 NOTE — PROGRESS NOTES
Gastroenterology Progress Note      Tanesha Foote is a 32 y.o. female patient. 1. Gastric outlet obstruction        SUBJECTIVE:  tolerating diet. Does have postprandial fullness. Some LLQ discomfort. No BM in days (typically goes a week w/o BMs). Physical    VITALS:  /75   Pulse 84   Temp 99 °F (37.2 °C) (Oral)   Resp 16   Ht 5' 2\" (1.575 m)   Wt 173 lb (78.5 kg)   LMP 2023 (Approximate)   SpO2 97%   BMI 31.64 kg/m²   TEMPERATURE:  Current - Temp: 99 °F (37.2 °C); Max - Temp  Av.1 °F (36.7 °C)  Min: 96.9 °F (36.1 °C)  Max: 100.2 °F (37.9 °C)    Constitutional: Alert and oriented x 4. No acute distress. Respiratory: Respirations nonlabored, no crepitus  GI: Abdomen nondistended, soft, and nontender. Neurological: No focal deficits noted. No asterixis. Data    Data Review:    Recent Labs     23  0633 23  0518   WBC 5.0 6.5   HGB 12.3 11.9*   HCT 36.3 35.4*   MCV 90.3 89.5    160     Recent Labs     23  0633 23  0518    141   K 4.0 3.9    110   CO2 22 23   PHOS 2.6 2.6   BUN 12 10   CREATININE 0.6 0.6     Recent Labs     23  0633   AST 24   ALT 20   BILIDIR <0.2   BILITOT 0.5   ALKPHOS 62     No results for input(s): LIPASE, AMYLASE in the last 72 hours. No results for input(s): PROTIME, INR in the last 72 hours. No results for input(s): PTT in the last 72 hours. ASSESSMENT / PLAN      Postprandial fullness, nausea - for several months. Had EGD at outside ED and it showed gastric distention concerning for GOO. EGD 3/21 was ok, no GOO. Biopsies of the esophagus, stomach and duodenum pending. May have gastroparesis. Started on PPI and Reglan. - f/u path  - PPI  - Reglan  - low fat, low fiber meals with frequent small volume meals  - outpatient f/u with GI. She states she will probably f/u with GI in [place].      Chronic constipation - some LLQ pain which could be from the pelvic congestion syndrome or constipation. - miralax BID and titrate to effect  - f/u GI in her area    Pelvic pain - for years. felt to be from pelvic congestion syndrome. Indira Valdez for d/c from GI standpoint with outpatient GI follow up.    Discussed with Dr. Camille Arevalo, [address] [place] [address]    This a very pleasant 68-year-old female who is seen at the bedside today with our certified physicians assistant  The patient continues to complain of pain part of her pain is in the right lower quadrant and left lower quadrant and this improved after having a bowel movement    For now she has more right upper quadrant pain    On physical exam she did have some tenderness in the right upper quadrant not a true Morton sign at this time    Her most recent liver enzymes are normal  She did have a repeat CT scan on the 21st of this month which did not show any acute cholecystitis    Get her CT scan and it her gallbladder does appear to be quite full of material there from enter order right upper quadrant ultrasound and start her on some Bentyl 10 mg p.o. twice daily    Kai Wynn md

## 2023-03-22 NOTE — PROGRESS NOTES
Morning assessment completed. Pt comfortably resting in bed. VSS. Morning meds given per MAR. Complain of pain and nausea, prn given. Call light and bedside table in reach. Bed in lowest position. The care plan and education has been reviewed and mutually agreed upon with the patient.

## 2023-03-22 NOTE — PROGRESS NOTES
[facility]    3/22/2023 2:14 PM        Name: Allison Dixon . Admitted: 3/21/2023  Primary Care Provider: Timoteo Mccollum (Tel: [phone])      Subjective:  . Patient seen this am and this afternoon. Continues to have post prandial bloating/earlier satiety. Also has LLQ. She is constipated.      Reviewed interval ancillary notes    Current Medications  polyethylene glycol (GLYCOLAX) packet 17 g, BID  lactulose (CHRONULAC) 10 GM/15ML solution 20 g, 6 times per day  milk and molasses enema 240 mL, Once  sodium chloride flush 0.9 % injection 5-40 mL, 2 times per day  sodium chloride flush 0.9 % injection 5-40 mL, PRN  0.9 % sodium chloride infusion, PRN  enoxaparin (LOVENOX) injection 40 mg, Nightly  ondansetron (ZOFRAN-ODT) disintegrating tablet 4 mg, Q8H PRN   Or  ondansetron (ZOFRAN) injection 4 mg, Q6H PRN  acetaminophen (TYLENOL) tablet 650 mg, Q6H PRN   Or  acetaminophen (TYLENOL) suppository 650 mg, Q6H PRN  lactated ringers IV soln infusion, Continuous  magnesium sulfate 2000 mg in 50 mL IVPB premix, PRN  potassium chloride (KLOR-CON M) extended release tablet 40 mEq, PRN   Or  potassium bicarb-citric acid (EFFER-K) effervescent tablet 40 mEq, PRN   Or  potassium chloride 10 mEq/100 mL IVPB (Peripheral Line), PRN  HYDROmorphone HCl PF (DILAUDID) injection 0.5 mg, Q3H PRN  pantoprazole (PROTONIX) tablet 40 mg, QAM AC  metoclopramide (REGLAN) tablet 10 mg, BID  nicotine (NICODERM CQ) 21 MG/24HR 1 patch, Daily        Objective:  /75   Pulse 84   Temp 99 °F (37.2 °C) (Oral)   Resp 16   Ht 5' 2\" (1.575 m)   Wt 173 lb (78.5 kg)   LMP 03/14/2023 (Approximate)   SpO2 97%   BMI 31.64 kg/m²     Intake/Output Summary (Last 24 hours) at 3/22/2023 1414  Last data filed at 3/21/2023 1511  Gross per 24 hour   Intake 500 ml   Output 0 ml   Net 500 ml      Wt Readings from Last 3 Encounters: 03/21/23 173 lb (78.5 kg)   03/19/12 150 lb (68 kg) (82 %, Z= 0.93)*     * Growth percentiles are based on CDC (Girls, 2-20 Years) data. General appearance:  Appears comfortable  Eyes: Sclera clear. Pupils equal.  ENT: Moist oral mucosa. Trachea midline, no adenopathy. Cardiovascular: Regular rhythm, normal S1, S2. No murmur. No edema in lower extremities  Respiratory: Not using accessory muscles. Good inspiratory effort. Clear to auscultation bilaterally, no wheeze or crackles. GI: Abdomen soft, no tenderness, not distended, normal bowel sounds  Musculoskeletal: No cyanosis in digits, neck supple  Neurology: CN 2-12 grossly intact. No speech or motor deficits  Psych: Normal affect. Alert and oriented in time, place and person  Skin: Warm, dry, normal turgor    Labs and Tests:  CBC:   Recent Labs     03/21/23  0633 03/22/23  0518   WBC 5.0 6.5   HGB 12.3 11.9*    160     BMP:    Recent Labs     03/21/23  0633 03/22/23  0518    141   K 4.0 3.9    110   CO2 22 23   BUN 12 10   CREATININE 0.6 0.6   GLUCOSE 87 136*     Hepatic:   Recent Labs     03/21/23  0633   AST 24   ALT 20   BILITOT 0.5   ALKPHOS 58       CT abd with iv contrast at [facility]: Severely distended stomach containing food and air-suggestive of GOO, Has left sided varicocele-previous left ovarian vein embolization. CT abd/pelvis 3/22  Bilateral lower lobe atelectatic changes. No evidence of bowel obstruction,   perforation or thickening. Mild constipation. No evidence of stomach outlet   obstruction. Gallbladder sludge but no obvious thickening. Trace of free fluid in the   cul-de-sac. No evidence of ascites or abscess.        EGD  Impression: Patient does not have a gastric outlet obstruction  Patient may have a slow stomach  There is the possibility that she has a gastritis and a the eosinophilic esophagitis will need to wait the biopsies        Recommendations: Short-term she will need a proton pump

## 2023-03-23 ENCOUNTER — APPOINTMENT (OUTPATIENT)
Dept: NUCLEAR MEDICINE | Age: 28
DRG: 263 | End: 2023-03-23
Attending: STUDENT IN AN ORGANIZED HEALTH CARE EDUCATION/TRAINING PROGRAM
Payer: MEDICAID

## 2023-03-23 ENCOUNTER — APPOINTMENT (OUTPATIENT)
Dept: ULTRASOUND IMAGING | Age: 28
DRG: 263 | End: 2023-03-23
Attending: STUDENT IN AN ORGANIZED HEALTH CARE EDUCATION/TRAINING PROGRAM
Payer: MEDICAID

## 2023-03-23 LAB
ALBUMIN SERPL-MCNC: 3.3 G/DL (ref 3.4–5)
ANION GAP SERPL CALCULATED.3IONS-SCNC: 8 MMOL/L (ref 3–16)
BASOPHILS # BLD: 0 K/UL (ref 0–0.2)
BASOPHILS NFR BLD: 0.3 %
BUN SERPL-MCNC: 7 MG/DL (ref 7–20)
CALCIUM SERPL-MCNC: 7.9 MG/DL (ref 8.3–10.6)
CHLORIDE SERPL-SCNC: 108 MMOL/L (ref 99–110)
CO2 SERPL-SCNC: 26 MMOL/L (ref 21–32)
CREAT SERPL-MCNC: 0.7 MG/DL (ref 0.6–1.1)
DEPRECATED RDW RBC AUTO: 13.5 % (ref 12.4–15.4)
EOSINOPHIL # BLD: 0.2 K/UL (ref 0–0.6)
EOSINOPHIL NFR BLD: 3.7 %
GFR SERPLBLD CREATININE-BSD FMLA CKD-EPI: >60 ML/MIN/{1.73_M2}
GLUCOSE SERPL-MCNC: 81 MG/DL (ref 70–99)
HCT VFR BLD AUTO: 32.2 % (ref 36–48)
HGB BLD-MCNC: 10.8 G/DL (ref 12–16)
LYMPHOCYTES # BLD: 1.9 K/UL (ref 1–5.1)
LYMPHOCYTES NFR BLD: 37.2 %
MAGNESIUM SERPL-MCNC: 1.7 MG/DL (ref 1.8–2.4)
MCH RBC QN AUTO: 30.1 PG (ref 26–34)
MCHC RBC AUTO-ENTMCNC: 33.6 G/DL (ref 31–36)
MCV RBC AUTO: 89.4 FL (ref 80–100)
MONOCYTES # BLD: 0.3 K/UL (ref 0–1.3)
MONOCYTES NFR BLD: 5.5 %
NEUTROPHILS # BLD: 2.8 K/UL (ref 1.7–7.7)
NEUTROPHILS NFR BLD: 53.3 %
PHOSPHATE SERPL-MCNC: 2.8 MG/DL (ref 2.5–4.9)
PLATELET # BLD AUTO: 110 K/UL (ref 135–450)
PMV BLD AUTO: 8.9 FL (ref 5–10.5)
POTASSIUM SERPL-SCNC: 3.7 MMOL/L (ref 3.5–5.1)
RBC # BLD AUTO: 3.6 M/UL (ref 4–5.2)
SODIUM SERPL-SCNC: 142 MMOL/L (ref 136–145)
WBC # BLD AUTO: 5.2 K/UL (ref 4–11)

## 2023-03-23 PROCEDURE — 2580000003 HC RX 258: Performed by: STUDENT IN AN ORGANIZED HEALTH CARE EDUCATION/TRAINING PROGRAM

## 2023-03-23 PROCEDURE — APPSS15 APP SPLIT SHARED TIME 0-15 MINUTES: Performed by: NURSE PRACTITIONER

## 2023-03-23 PROCEDURE — 36415 COLL VENOUS BLD VENIPUNCTURE: CPT

## 2023-03-23 PROCEDURE — APPNB30 APP NON BILLABLE TIME 0-30 MINS: Performed by: NURSE PRACTITIONER

## 2023-03-23 PROCEDURE — 2580000003 HC RX 258: Performed by: NURSE PRACTITIONER

## 2023-03-23 PROCEDURE — 99232 SBSQ HOSP IP/OBS MODERATE 35: CPT | Performed by: SURGERY

## 2023-03-23 PROCEDURE — 76705 ECHO EXAM OF ABDOMEN: CPT

## 2023-03-23 PROCEDURE — 1200000000 HC SEMI PRIVATE

## 2023-03-23 PROCEDURE — 3430000000 HC RX DIAGNOSTIC RADIOPHARMACEUTICAL: Performed by: NURSE PRACTITIONER

## 2023-03-23 PROCEDURE — 80069 RENAL FUNCTION PANEL: CPT

## 2023-03-23 PROCEDURE — 6360000004 HC RX CONTRAST MEDICATION: Performed by: NURSE PRACTITIONER

## 2023-03-23 PROCEDURE — 6360000002 HC RX W HCPCS: Performed by: NURSE PRACTITIONER

## 2023-03-23 PROCEDURE — 6370000000 HC RX 637 (ALT 250 FOR IP): Performed by: PHYSICIAN ASSISTANT

## 2023-03-23 PROCEDURE — 6370000000 HC RX 637 (ALT 250 FOR IP): Performed by: INTERNAL MEDICINE

## 2023-03-23 PROCEDURE — 85025 COMPLETE CBC W/AUTO DIFF WBC: CPT

## 2023-03-23 PROCEDURE — A9537 TC99M MEBROFENIN: HCPCS | Performed by: NURSE PRACTITIONER

## 2023-03-23 PROCEDURE — 83735 ASSAY OF MAGNESIUM: CPT

## 2023-03-23 PROCEDURE — 6370000000 HC RX 637 (ALT 250 FOR IP): Performed by: STUDENT IN AN ORGANIZED HEALTH CARE EDUCATION/TRAINING PROGRAM

## 2023-03-23 PROCEDURE — 94760 N-INVAS EAR/PLS OXIMETRY 1: CPT

## 2023-03-23 PROCEDURE — 6360000002 HC RX W HCPCS: Performed by: STUDENT IN AN ORGANIZED HEALTH CARE EDUCATION/TRAINING PROGRAM

## 2023-03-23 PROCEDURE — 78227 HEPATOBIL SYST IMAGE W/DRUG: CPT

## 2023-03-23 RX ORDER — MAGNESIUM SULFATE 1 G/100ML
1000 INJECTION INTRAVENOUS ONCE
Status: COMPLETED | OUTPATIENT
Start: 2023-03-23 | End: 2023-03-23

## 2023-03-23 RX ORDER — DICYCLOMINE HYDROCHLORIDE 10 MG/1
10 CAPSULE ORAL EVERY 12 HOURS
Qty: 60 CAPSULE | Refills: 0 | Status: SHIPPED | OUTPATIENT
Start: 2023-03-23 | End: 2023-04-22

## 2023-03-23 RX ORDER — PANTOPRAZOLE SODIUM 40 MG/1
40 TABLET, DELAYED RELEASE ORAL
Qty: 30 TABLET | Refills: 1 | Status: SHIPPED | OUTPATIENT
Start: 2023-03-24

## 2023-03-23 RX ORDER — SODIUM CHLORIDE 0.9 % (FLUSH) 0.9 %
10 SYRINGE (ML) INJECTION PRN
Status: DISCONTINUED | OUTPATIENT
Start: 2023-03-23 | End: 2023-03-25 | Stop reason: HOSPADM

## 2023-03-23 RX ORDER — POLYETHYLENE GLYCOL 3350 17 G/17G
17 POWDER, FOR SOLUTION ORAL 2 TIMES DAILY
Qty: 527 G | Refills: 1 | Status: SHIPPED | OUTPATIENT
Start: 2023-03-23 | End: 2023-04-22

## 2023-03-23 RX ORDER — METOCLOPRAMIDE 10 MG/1
10 TABLET ORAL 2 TIMES DAILY
Qty: 60 TABLET | Refills: 0 | Status: SHIPPED | OUTPATIENT
Start: 2023-03-23 | End: 2023-04-22

## 2023-03-23 RX ORDER — SODIUM CHLORIDE 9 MG/ML
INJECTION, SOLUTION INTRAVENOUS ONCE
Status: COMPLETED | OUTPATIENT
Start: 2023-03-23 | End: 2023-03-23

## 2023-03-23 RX ADMIN — ENOXAPARIN SODIUM 40 MG: 100 INJECTION SUBCUTANEOUS at 20:08

## 2023-03-23 RX ADMIN — Medication 4.56 MILLICURIE: at 13:31

## 2023-03-23 RX ADMIN — SINCALIDE 1.57 MCG: 5 INJECTION, POWDER, LYOPHILIZED, FOR SOLUTION INTRAVENOUS at 13:31

## 2023-03-23 RX ADMIN — HYDROMORPHONE HYDROCHLORIDE 0.5 MG: 1 INJECTION, SOLUTION INTRAMUSCULAR; INTRAVENOUS; SUBCUTANEOUS at 15:13

## 2023-03-23 RX ADMIN — POLYETHYLENE GLYCOL 3350 17 G: 17 POWDER, FOR SOLUTION ORAL at 20:15

## 2023-03-23 RX ADMIN — Medication 10 ML: at 13:31

## 2023-03-23 RX ADMIN — METOCLOPRAMIDE 10 MG: 10 TABLET ORAL at 20:08

## 2023-03-23 RX ADMIN — ACETAMINOPHEN 650 MG: 325 TABLET ORAL at 18:15

## 2023-03-23 RX ADMIN — HYDROMORPHONE HYDROCHLORIDE 0.5 MG: 1 INJECTION, SOLUTION INTRAMUSCULAR; INTRAVENOUS; SUBCUTANEOUS at 09:09

## 2023-03-23 RX ADMIN — PANTOPRAZOLE SODIUM 40 MG: 40 TABLET, DELAYED RELEASE ORAL at 06:12

## 2023-03-23 RX ADMIN — HYDROMORPHONE HYDROCHLORIDE 0.5 MG: 1 INJECTION, SOLUTION INTRAMUSCULAR; INTRAVENOUS; SUBCUTANEOUS at 04:33

## 2023-03-23 RX ADMIN — MAGNESIUM SULFATE HEPTAHYDRATE 1000 MG: 1 INJECTION, SOLUTION INTRAVENOUS at 15:45

## 2023-03-23 RX ADMIN — SODIUM CHLORIDE, POTASSIUM CHLORIDE, SODIUM LACTATE AND CALCIUM CHLORIDE: 600; 310; 30; 20 INJECTION, SOLUTION INTRAVENOUS at 04:36

## 2023-03-23 RX ADMIN — SODIUM CHLORIDE 100 ML: 9 INJECTION, SOLUTION INTRAVENOUS at 13:31

## 2023-03-23 RX ADMIN — HYDROMORPHONE HYDROCHLORIDE 0.5 MG: 1 INJECTION, SOLUTION INTRAMUSCULAR; INTRAVENOUS; SUBCUTANEOUS at 20:08

## 2023-03-23 RX ADMIN — DICYCLOMINE HYDROCHLORIDE 10 MG: 10 CAPSULE ORAL at 20:08

## 2023-03-23 ASSESSMENT — PAIN DESCRIPTION - LOCATION
LOCATION: ABDOMEN

## 2023-03-23 ASSESSMENT — PAIN SCALES - GENERAL
PAINLEVEL_OUTOF10: 8
PAINLEVEL_OUTOF10: 7
PAINLEVEL_OUTOF10: 8
PAINLEVEL_OUTOF10: 8
PAINLEVEL_OUTOF10: 7

## 2023-03-23 ASSESSMENT — PAIN DESCRIPTION - DESCRIPTORS
DESCRIPTORS: ACHING

## 2023-03-23 ASSESSMENT — PAIN DESCRIPTION - FREQUENCY
FREQUENCY: CONTINUOUS
FREQUENCY: CONTINUOUS

## 2023-03-23 ASSESSMENT — PAIN DESCRIPTION - PAIN TYPE
TYPE: ACUTE PAIN
TYPE: ACUTE PAIN

## 2023-03-23 NOTE — PROGRESS NOTES
[facility]        Progress Note    Patient Name: Lico Guevara  MRN: [id number]  YOB: 1995  Date of Evaluation: 3/23/2023    Chief Complaint: Abdominal pain    Subjective:  No acute events overnight  Pain remains along left side, moved towards center but then improved following BM but not completely resolved  No nausea or vomiting  Ambulates without difficulty      Vital Signs:  Patient Vitals for the past 24 hrs:   BP Temp Temp src Pulse Resp SpO2   23 1500 121/78 98.6 °F (37 °C) -- 77 16 98 %   23 1308 -- -- -- 79 18 96 %   23 1204 121/78 98.6 °F (37 °C) Oral 77 16 96 %   23 0909 -- -- -- -- 16 --   23 0745 125/80 98.1 °F (36.7 °C) Oral 78 16 97 %   23 0428 120/80 98.5 °F (36.9 °C) Oral 80 16 96 %   23 2343 117/75 98.1 °F (36.7 °C) Oral 72 16 96 %   23 1929 131/82 98.4 °F (36.9 °C) Oral 86 16 99 %   23 1650 -- -- -- -- 16 --   23 1620 -- -- -- -- 18 --      TEMPERATURE HISTORY 24H: Temp (24hrs), Av.4 °F (36.9 °C), Min:98.1 °F (36.7 °C), Max:98.6 °F (37 °C)    BLOOD PRESSURE HISTORY: Systolic (50VSP), ZHB:943 , Min:112 , ELLEN:091    Diastolic (48XVC), UWF:16, Min:72, Max:82      Intake/Output:  No intake/output data recorded. No intake/output data recorded.   Drain/tube Output:       Physical Exam:  General: awake, alert, oriented to  person, place, time  Cardiovascular:  regular rate and rhythm and no murmur noted  Lungs: clear to auscultation  Abdomen: soft, non-distended, poorly localized left-sided tenderness only, bowel sounds present     Labs:  CBC:    Recent Labs     23  0633 23  0518 23  0557   WBC 5.0 6.5 5.2   HGB 12.3 11.9* 10.8*   HCT 36.3 35.4* 32.2*    160 110*     BMP:    Recent Labs     23  0633 23  0518 23  0557    141 142   K 4.0 3.9 3.7    110 108   CO2 22 23 26   BUN 12 10 7   CREATININE 0.6 0.6 0.7   GLUCOSE 87 136* 81 patient and present family members, performing physical exam, documentation of my findings and subsequent follow up of ordered medication and testing, placing referrals and communication with patient care providers, coordinating future care as well as documentation in the EHR. This encompassed more than 50% of the total encounter time. In summary, my findings and plan are the following:   Pt unable to get out of pain. Pain in the RUQ, had pain with HIDA. DW Tavia De Jesus, pt states she wants to proceed with LC.   HIDA is mildly abnormal. Colon has been cleared with enemas and no other sig pathology identified, so we will proceed at this point with surgery tomorrow unless by some chance she is better in the am    Sourav Soni MD

## 2023-03-23 NOTE — PROGRESS NOTES
Pt returned from [address] [place] [address]. C/o 7/10 abdominal pain. Gave Dilaudid 0.5 mg IVP. Pt is axox4 and able to answer questions and follow commands throughout assessment. Remains NPO. Independent in room. Will continue to monitor.

## 2023-03-23 NOTE — PROGRESS NOTES
CLINICAL PHARMACY NOTE: MEDS TO BEDS    Total # of Prescriptions Filled: 4   The following medications were delivered to the patient:  Dicyclomine 20 mg (10 mg backorder)  Miralax  Metoclopramide 10 mg  Protonix 40 mg    Additional Documentation:  Delivered to patient=signed  Ok to be delivered per [facility]ian Day

## 2023-03-23 NOTE — PROGRESS NOTES
Gastroenterology Progress Note      Paulette Trinh is a 32 y.o. female patient. Principal Problem:    Gastric outlet obstruction  Active Problems:    RUQ abdominal pain    Biliary sludge  Resolved Problems:    * No resolved hospital problems. *      SUBJECTIVE: Continues to have 2 GI issues 1 is constipation 1 is a right upper quadrant pain    ROS:  No fever, chills  No chest pain, palpitations  No SOB, cough  Gastrointestinal ROS: no abdominal pain, nausea, vomiting     Physical    VITALS:  /78   Pulse 77   Temp 98.6 °F (37 °C)   Resp 16   Ht 5' 2\" (1.575 m)   Wt 173 lb (78.5 kg)   LMP 2023 (Approximate)   SpO2 98%   BMI 31.64 kg/m²   TEMPERATURE:  Current - Temp: 98.6 °F (37 °C); Max - Temp  Av.4 °F (36.9 °C)  Min: 98.1 °F (36.7 °C)  Max: 98.6 °F (37 °C)    NAD  RRR, Nl s1s2  Lungs CTA Bilaterally, normal effort  Abdomen soft, ND, NT, no HSM, Bowel sounds normal.    Data    Data Review:    Recent Labs     23  0633 23  0518 23  0557   WBC 5.0 6.5 5.2   HGB 12.3 11.9* 10.8*   HCT 36.3 35.4* 32.2*   MCV 90.3 89.5 89.4    160 110*     Recent Labs     23  0633 23  0518 23  0557    141 142   K 4.0 3.9 3.7    110 108   CO2 22 23 26   PHOS 2.6 2.6 2.8   BUN 12 10 7   CREATININE 0.6 0.6 0.7     Recent Labs     23  0633 23  0518   AST 24 25   ALT 20 22   BILIDIR <0.2 <0.2   BILITOT 0.5 <0.2   ALKPHOS 62 56     No results for input(s): LIPASE, AMYLASE in the last 72 hours. No results for input(s): PROTIME, INR in the last 72 hours. No results for input(s): PTT in the last 72 hours. Radiology Review:  The hida scan and ultrasound reviewed      ASSESSMENT upper quadrant pain  -Had pain with her HIDA scan her ejection fraction was slightly low  -Has discussed this with surgery and they feel that a lap agustin tomorrow would be the appropriate thing  Constipation  -She has received some soapsuds enemas  -We will consider putting her on Linzess at this time to see if this helps her constipation    Elizabeth Nina

## 2023-03-23 NOTE — ADT AUTH CERT
try enema. Diet: ADULT DIET; Regular   Code:Full Code   DVT PPX: lovenox      GI PN:       ASSESSMENT / PLAN         Postprandial fullness, nausea - for several months. Had EGD at outside ED and it showed gastric distention concerning for GOO. EGD 3/21 was ok, no GOO. Biopsies of the esophagus, stomach and duodenum pending. May have gastroparesis. Started on PPI and Reglan. - f/u path   - PPI   - Reglan   - low fat, low fiber meals with frequent small volume meals   - outpatient f/u with GI. She states she will probably f/u with GI in [place]. Chronic constipation - some LLQ pain which could be from the pelvic congestion syndrome or constipation. - miralax BID and titrate to effect   - f/u GI in her area       Pelvic pain - for years. felt to be from pelvic congestion syndrome.              Medications:   polyethylene glycol, 17 g, Oral, BID   lactulose, 20 g, Oral, 6 times per day   milk and molasses, 240 mL, Rectal, Once   enoxaparin, 40 mg, SubCUTAneous, Nightly   pantoprazole, 40 mg, Oral, QAM AC   metoclopramide, 10 mg, Oral, BID   nicotine, 1 patch, TransDERmal, Daily   Dilaudid 0.5mg iv q3hrs prn x2   Zofran 4mg iv q6hrs prn x1               Orders:   consult general surgery                       Gastroenterology GRG - Care Day 1 (3/21/2023) by Curley Hodgkin, RN       Review Status Review Entered   Completed 3/22/2023 1443       Created By   Curley Hodgkin, RN      Criteria Review      Care Day: 1 Care Date: 3/21/2023 Level of Care: Inpatient Floor    Guideline Day 1    Level Of Care    ( ) ICU or intermediate care    3/22/2023 2:42 PM EDT by Sarita Smalls      med surg    Clinical Status    (X) * Clinical Indications met    Interventions    (X) Inpatient interventions as needed    3/22/2023 2:42 PM EDT by Sarita Smalls      gi consult - egd    (X) NPO if enteral studies planned or bowel rest needed    3/22/2023 2:42 PM EDT by Sarita Smalls      NPO - egd today    * Neck: Supple, with full range of motion. No jugular venous distention. Trachea midline. Respiratory:  Normal respiratory effort. Clear to auscultation, bilaterally without Rales/Wheezes/Rhonchi. Cardiovascular:  Regular rate and rhythm with normal S1/S2 without murmurs, rubs or gallops. No peripheral edema. Abdomen: Soft, non-distended with normal bowel sounds. Moderate tenderness to palpation diffusely. : No CVA tenderness   Musculoskeletal:  No clubbing or cyanosis. Full range of motion without deformity. Skin: Skin color, texture, turgor normal.  No rashes or lesions. Neurologic:  Neurovascularly intact without any focal sensory/motor deficits. Cranial nerves: II-XII intact, grossly non-focal.   Psychiatric:  Alert and oriented, thought content appropriate, normal insight   Capillary Refill: Brisk, 3 seconds, normal    Peripheral Pulses: +2 palpable, equal bilaterally           MD Consults/Assessments & Plans:   IM PN:   ASSESSMENT:       Active Hospital Problems     Diagnosis Date Noted   · Gastric outlet obstruction [K31.1] 03/21/2023       Priority: Medium               PLAN:   This is a 32 y.o. female who presented to [facility] and is being treated for:       Gastric outlet obstruction   -Imaging from [facility] ED indicated gastric outlet obstruction   -CT abdomen pelvis ordered   -Labs ordered   -IVF   -N.p.o.   -Defer NG placement to GI   -Pain control with IV pain meds   -GI consulted; appreciate recommendations               DVT ppx: Lovenox   GI ppx: PPI   Diet: Diet NPO      IM PN: addendum   Patient admitted after midnight with gastric outlet obstruction. Has had chronic abdominal pain. She has been nauseated after eating for a few month. She has 4 kids age 1-12 (has been pregnant 7 times). She works at [employer]. Boyfriend lives with her and the children. She reports irrregular periods. She has also been constipated requiring laxatives.  Had small pebble like BM yesterday but

## 2023-03-23 NOTE — CONSULTS
carotid bruits  LUNGS:  clear to auscultation  CARDIOVASCULAR:  regular rate and rhythm and no murmur noted  ABDOMEN:  no scars, hypoactive bowel sounds, soft, non-distended, tenderness noted in the epigastric region and in the left lower quadrant, voluntary guarding absent, no masses palpated, no hepatosplenomegally and hernia absent  MUSCULOSKELETAL:  0+ pitting edema lower extremities  NEUROLOGIC:  Mental Status Exam:  Level of Alertness:   awake  Orientation:   person, place, time  SKIN:  no bruising or bleeding and normal skin color, texture, turgor    DATA:    CBC:   Recent Labs     03/21/23 0633 03/22/23 0518   WBC 5.0 6.5   HGB 12.3 11.9*   HCT 36.3 35.4*    160     BMP:    Recent Labs     03/21/23 0633 03/22/23 0518    141   K 4.0 3.9    110   CO2 22 23   BUN 12 10   CREATININE 0.6 0.6   GLUCOSE 87 136*     Hepatic:   Recent Labs     03/21/23 0633 03/22/23 0518   AST 24 25   ALT 20 22   BILITOT 0.5 <0.2   ALKPHOS 62 56     Mag:      Recent Labs     03/21/23 0633 03/22/23 0518   MG 2.10 1.90      Phos:     Recent Labs     03/21/23 0633 03/22/23 0518   PHOS 2.6 2.6      INR: No results for input(s): INR in the last 72 hours. LIPASE: No results for input(s): LIPASE in the last 72 hours. AMYLASE: No results for input(s): AMYLASE in the last 72 hours. Radiology Review:       CT ABDOMEN PELVIS WO CONTRAST Additional Contrast? None    Result Date: 3/21/2023  EXAMINATION: CT OF THE ABDOMEN AND PELVIS WITHOUT CONTRAST 3/21/2023 4:28 pm TECHNIQUE: CT of the abdomen and pelvis was performed without the administration of intravenous contrast. Multiplanar reformatted images are provided for review. Automated exposure control, iterative reconstruction, and/or weight based adjustment of the mA/kV was utilized to reduce the radiation dose to as low as reasonably achievable. COMPARISON: None.  HISTORY: ORDERING SYSTEM PROVIDED HISTORY: gastric outlet obstruction TECHNOLOGIST PROVIDED HISTORY: Reason for exam:->gastric outlet obstruction Additional Contrast?->None Is the patient pregnant?->No FINDINGS: Lower Chest: Mild bilateral lower lobe atelectatic changes. Organs: The liver appears unremarkable. There is increased density in the gallbladder which may represent sludge. No obvious thickening. Pancreas, spleen, adrenals, kidneys, aorta, and IVC appear stable. GI/Bowel: No evidence of bowel obstruction or perforation. The stomach does not appear distended. Mild constipation. Normal appendix. Pelvis: Uterus, adnexa and urinary bladder appear stable. Some free fluid in the cul-de-sac. No evidence of lymphadenopathy. Peritoneum/Retroperitoneum: No evidence of retroperitoneal lymphadenopathy. Minimal fat containing periumbilical hernia. Bones/Soft Tissues: No acute abnormality. Bilateral lower lobe atelectatic changes. No evidence of bowel obstruction, perforation or thickening. Mild constipation. No evidence of stomach outlet obstruction. Gallbladder sludge but no obvious thickening. Trace of free fluid in the cul-de-sac. No evidence of ascites or abscess. IMPRESSION/RECOMMENDATIONS:    Abd pain  Left issues seem to be functional and related to intestinal colic and constipation. Pt does not have regular BM, had pain assoc with cramping and peristalsis. May improve with laxatives and enemas. Needs treatment for constipation, ingoing laxative, fiber, and prokinetic. Epigastric / RUQ issue could be related to the gallbladder. We will order a GB US to better assess.      Thank you,    Zain Amrbocio MD

## 2023-03-24 ENCOUNTER — APPOINTMENT (OUTPATIENT)
Dept: GENERAL RADIOLOGY | Age: 28
DRG: 263 | End: 2023-03-24
Attending: STUDENT IN AN ORGANIZED HEALTH CARE EDUCATION/TRAINING PROGRAM
Payer: MEDICAID

## 2023-03-24 ENCOUNTER — ANESTHESIA (OUTPATIENT)
Dept: OPERATING ROOM | Age: 28
End: 2023-03-24
Payer: MEDICAID

## 2023-03-24 ENCOUNTER — ANESTHESIA EVENT (OUTPATIENT)
Dept: OPERATING ROOM | Age: 28
End: 2023-03-24
Payer: MEDICAID

## 2023-03-24 LAB
ALBUMIN SERPL-MCNC: 3.4 G/DL (ref 3.4–5)
ALBUMIN/GLOB SERPL: 1.4 {RATIO} (ref 1.1–2.2)
ALP SERPL-CCNC: 49 U/L (ref 40–129)
ALT SERPL-CCNC: 40 U/L (ref 10–40)
ANION GAP SERPL CALCULATED.3IONS-SCNC: 7 MMOL/L (ref 3–16)
AST SERPL-CCNC: 33 U/L (ref 15–37)
BASOPHILS # BLD: 0 K/UL (ref 0–0.2)
BASOPHILS NFR BLD: 0.5 %
BILIRUB SERPL-MCNC: 0.3 MG/DL (ref 0–1)
BUN SERPL-MCNC: 9 MG/DL (ref 7–20)
CALCIUM SERPL-MCNC: 8.2 MG/DL (ref 8.3–10.6)
CHLORIDE SERPL-SCNC: 105 MMOL/L (ref 99–110)
CO2 SERPL-SCNC: 26 MMOL/L (ref 21–32)
CREAT SERPL-MCNC: 0.8 MG/DL (ref 0.6–1.1)
DEPRECATED RDW RBC AUTO: 13.5 % (ref 12.4–15.4)
EOSINOPHIL # BLD: 0.2 K/UL (ref 0–0.6)
EOSINOPHIL NFR BLD: 3.8 %
GFR SERPLBLD CREATININE-BSD FMLA CKD-EPI: >60 ML/MIN/{1.73_M2}
GLUCOSE SERPL-MCNC: 90 MG/DL (ref 70–99)
HCG UR QL: NEGATIVE
HCT VFR BLD AUTO: 33.3 % (ref 36–48)
HGB BLD-MCNC: 11.6 G/DL (ref 12–16)
LIPASE SERPL-CCNC: 22 U/L (ref 13–60)
LYMPHOCYTES # BLD: 2.1 K/UL (ref 1–5.1)
LYMPHOCYTES NFR BLD: 35.5 %
MCH RBC QN AUTO: 30.9 PG (ref 26–34)
MCHC RBC AUTO-ENTMCNC: 34.8 G/DL (ref 31–36)
MCV RBC AUTO: 88.8 FL (ref 80–100)
MONOCYTES # BLD: 0.4 K/UL (ref 0–1.3)
MONOCYTES NFR BLD: 6 %
NEUTROPHILS # BLD: 3.2 K/UL (ref 1.7–7.7)
NEUTROPHILS NFR BLD: 54.2 %
PLATELET # BLD AUTO: 157 K/UL (ref 135–450)
PMV BLD AUTO: 8.5 FL (ref 5–10.5)
POTASSIUM SERPL-SCNC: 3.5 MMOL/L (ref 3.5–5.1)
PROT SERPL-MCNC: 5.8 G/DL (ref 6.4–8.2)
RBC # BLD AUTO: 3.75 M/UL (ref 4–5.2)
SODIUM SERPL-SCNC: 138 MMOL/L (ref 136–145)
WBC # BLD AUTO: 5.9 K/UL (ref 4–11)

## 2023-03-24 PROCEDURE — 83690 ASSAY OF LIPASE: CPT

## 2023-03-24 PROCEDURE — 7100000001 HC PACU RECOVERY - ADDTL 15 MIN: Performed by: SURGERY

## 2023-03-24 PROCEDURE — 1200000000 HC SEMI PRIVATE

## 2023-03-24 PROCEDURE — 6360000002 HC RX W HCPCS: Performed by: ANESTHESIOLOGY

## 2023-03-24 PROCEDURE — 7100000000 HC PACU RECOVERY - FIRST 15 MIN: Performed by: SURGERY

## 2023-03-24 PROCEDURE — 99232 SBSQ HOSP IP/OBS MODERATE 35: CPT | Performed by: SURGERY

## 2023-03-24 PROCEDURE — 2500000003 HC RX 250 WO HCPCS: Performed by: NURSE ANESTHETIST, CERTIFIED REGISTERED

## 2023-03-24 PROCEDURE — 6360000002 HC RX W HCPCS: Performed by: NURSE ANESTHETIST, CERTIFIED REGISTERED

## 2023-03-24 PROCEDURE — 84703 CHORIONIC GONADOTROPIN ASSAY: CPT

## 2023-03-24 PROCEDURE — 85025 COMPLETE CBC W/AUTO DIFF WBC: CPT

## 2023-03-24 PROCEDURE — 3600000014 HC SURGERY LEVEL 4 ADDTL 15MIN: Performed by: SURGERY

## 2023-03-24 PROCEDURE — 74300 X-RAY BILE DUCTS/PANCREAS: CPT

## 2023-03-24 PROCEDURE — 6360000002 HC RX W HCPCS: Performed by: SURGERY

## 2023-03-24 PROCEDURE — 6360000004 HC RX CONTRAST MEDICATION: Performed by: SURGERY

## 2023-03-24 PROCEDURE — 36415 COLL VENOUS BLD VENIPUNCTURE: CPT

## 2023-03-24 PROCEDURE — 80053 COMPREHEN METABOLIC PANEL: CPT

## 2023-03-24 PROCEDURE — 2580000003 HC RX 258: Performed by: STUDENT IN AN ORGANIZED HEALTH CARE EDUCATION/TRAINING PROGRAM

## 2023-03-24 PROCEDURE — 0FT44ZZ RESECTION OF GALLBLADDER, PERCUTANEOUS ENDOSCOPIC APPROACH: ICD-10-PCS | Performed by: SURGERY

## 2023-03-24 PROCEDURE — 3700000000 HC ANESTHESIA ATTENDED CARE: Performed by: SURGERY

## 2023-03-24 PROCEDURE — 6360000002 HC RX W HCPCS: Performed by: STUDENT IN AN ORGANIZED HEALTH CARE EDUCATION/TRAINING PROGRAM

## 2023-03-24 PROCEDURE — 47563 LAPARO CHOLECYSTECTOMY/GRAPH: CPT | Performed by: SURGERY

## 2023-03-24 PROCEDURE — 6370000000 HC RX 637 (ALT 250 FOR IP): Performed by: SURGERY

## 2023-03-24 PROCEDURE — 3600000004 HC SURGERY LEVEL 4 BASE: Performed by: SURGERY

## 2023-03-24 PROCEDURE — APPNB30 APP NON BILLABLE TIME 0-30 MINS: Performed by: NURSE PRACTITIONER

## 2023-03-24 PROCEDURE — 3700000001 HC ADD 15 MINUTES (ANESTHESIA): Performed by: SURGERY

## 2023-03-24 PROCEDURE — 88304 TISSUE EXAM BY PATHOLOGIST: CPT

## 2023-03-24 PROCEDURE — 2720000010 HC SURG SUPPLY STERILE: Performed by: SURGERY

## 2023-03-24 PROCEDURE — BF121ZZ FLUOROSCOPY OF GALLBLADDER USING LOW OSMOLAR CONTRAST: ICD-10-PCS | Performed by: SURGERY

## 2023-03-24 PROCEDURE — 2709999900 HC NON-CHARGEABLE SUPPLY: Performed by: SURGERY

## 2023-03-24 PROCEDURE — APPSS15 APP SPLIT SHARED TIME 0-15 MINUTES: Performed by: NURSE PRACTITIONER

## 2023-03-24 PROCEDURE — 2500000003 HC RX 250 WO HCPCS: Performed by: SURGERY

## 2023-03-24 RX ORDER — SODIUM CHLORIDE 9 MG/ML
INJECTION, SOLUTION INTRAVENOUS CONTINUOUS
Status: DISCONTINUED | OUTPATIENT
Start: 2023-03-24 | End: 2023-03-25 | Stop reason: HOSPADM

## 2023-03-24 RX ORDER — ONDANSETRON 2 MG/ML
4 INJECTION INTRAMUSCULAR; INTRAVENOUS
Status: DISCONTINUED | OUTPATIENT
Start: 2023-03-24 | End: 2023-03-24 | Stop reason: HOSPADM

## 2023-03-24 RX ORDER — LABETALOL HYDROCHLORIDE 5 MG/ML
5 INJECTION, SOLUTION INTRAVENOUS
Status: DISCONTINUED | OUTPATIENT
Start: 2023-03-24 | End: 2023-03-24 | Stop reason: HOSPADM

## 2023-03-24 RX ORDER — OXYCODONE HYDROCHLORIDE AND ACETAMINOPHEN 5; 325 MG/1; MG/1
1 TABLET ORAL EVERY 4 HOURS PRN
Status: DISCONTINUED | OUTPATIENT
Start: 2023-03-24 | End: 2023-03-25 | Stop reason: HOSPADM

## 2023-03-24 RX ORDER — BUPIVACAINE HYDROCHLORIDE AND EPINEPHRINE 5; 5 MG/ML; UG/ML
INJECTION, SOLUTION EPIDURAL; INTRACAUDAL; PERINEURAL
Status: COMPLETED | OUTPATIENT
Start: 2023-03-24 | End: 2023-03-24

## 2023-03-24 RX ORDER — SODIUM CHLORIDE 0.9 % (FLUSH) 0.9 %
5-40 SYRINGE (ML) INJECTION PRN
Status: DISCONTINUED | OUTPATIENT
Start: 2023-03-24 | End: 2023-03-24 | Stop reason: HOSPADM

## 2023-03-24 RX ORDER — DIPHENHYDRAMINE HYDROCHLORIDE 50 MG/ML
INJECTION INTRAMUSCULAR; INTRAVENOUS PRN
Status: DISCONTINUED | OUTPATIENT
Start: 2023-03-24 | End: 2023-03-24 | Stop reason: SDUPTHER

## 2023-03-24 RX ORDER — SODIUM CHLORIDE 9 MG/ML
INJECTION, SOLUTION INTRAVENOUS PRN
Status: DISCONTINUED | OUTPATIENT
Start: 2023-03-24 | End: 2023-03-24 | Stop reason: HOSPADM

## 2023-03-24 RX ORDER — HYDROMORPHONE HCL 110MG/55ML
0.25 PATIENT CONTROLLED ANALGESIA SYRINGE INTRAVENOUS EVERY 5 MIN PRN
Status: DISCONTINUED | OUTPATIENT
Start: 2023-03-24 | End: 2023-03-24 | Stop reason: HOSPADM

## 2023-03-24 RX ORDER — SUCCINYLCHOLINE/SOD CL,ISO/PF 200MG/10ML
SYRINGE (ML) INTRAVENOUS PRN
Status: DISCONTINUED | OUTPATIENT
Start: 2023-03-24 | End: 2023-03-24 | Stop reason: SDUPTHER

## 2023-03-24 RX ORDER — ACETAMINOPHEN 500 MG
1000 TABLET ORAL EVERY 6 HOURS PRN
Status: DISCONTINUED | OUTPATIENT
Start: 2023-03-24 | End: 2023-03-25 | Stop reason: HOSPADM

## 2023-03-24 RX ORDER — PROPOFOL 10 MG/ML
INJECTION, EMULSION INTRAVENOUS PRN
Status: DISCONTINUED | OUTPATIENT
Start: 2023-03-24 | End: 2023-03-24 | Stop reason: SDUPTHER

## 2023-03-24 RX ORDER — HYDROCODONE BITARTRATE AND ACETAMINOPHEN 5; 325 MG/1; MG/1
1 TABLET ORAL EVERY 6 HOURS PRN
Qty: 12 TABLET | Refills: 0 | Status: SHIPPED | OUTPATIENT
Start: 2023-03-24 | End: 2023-03-25 | Stop reason: SDUPTHER

## 2023-03-24 RX ORDER — LEVOFLOXACIN 5 MG/ML
INJECTION, SOLUTION INTRAVENOUS
Status: DISPENSED
Start: 2023-03-24 | End: 2023-03-25

## 2023-03-24 RX ORDER — MAGNESIUM SULFATE HEPTAHYDRATE 500 MG/ML
INJECTION, SOLUTION INTRAMUSCULAR; INTRAVENOUS PRN
Status: DISCONTINUED | OUTPATIENT
Start: 2023-03-24 | End: 2023-03-24 | Stop reason: SDUPTHER

## 2023-03-24 RX ORDER — SODIUM CHLORIDE 0.9 % (FLUSH) 0.9 %
5-40 SYRINGE (ML) INJECTION EVERY 12 HOURS SCHEDULED
Status: DISCONTINUED | OUTPATIENT
Start: 2023-03-24 | End: 2023-03-24 | Stop reason: HOSPADM

## 2023-03-24 RX ORDER — DEXAMETHASONE SODIUM PHOSPHATE 4 MG/ML
INJECTION, SOLUTION INTRA-ARTICULAR; INTRALESIONAL; INTRAMUSCULAR; INTRAVENOUS; SOFT TISSUE PRN
Status: DISCONTINUED | OUTPATIENT
Start: 2023-03-24 | End: 2023-03-24 | Stop reason: SDUPTHER

## 2023-03-24 RX ORDER — HYDROMORPHONE HYDROCHLORIDE 1 MG/ML
0.5 INJECTION, SOLUTION INTRAMUSCULAR; INTRAVENOUS; SUBCUTANEOUS
Status: DISCONTINUED | OUTPATIENT
Start: 2023-03-24 | End: 2023-03-25 | Stop reason: HOSPADM

## 2023-03-24 RX ORDER — FENTANYL CITRATE 50 UG/ML
INJECTION, SOLUTION INTRAMUSCULAR; INTRAVENOUS PRN
Status: DISCONTINUED | OUTPATIENT
Start: 2023-03-24 | End: 2023-03-24 | Stop reason: SDUPTHER

## 2023-03-24 RX ORDER — MIDAZOLAM HYDROCHLORIDE 1 MG/ML
INJECTION INTRAMUSCULAR; INTRAVENOUS PRN
Status: DISCONTINUED | OUTPATIENT
Start: 2023-03-24 | End: 2023-03-24 | Stop reason: SDUPTHER

## 2023-03-24 RX ORDER — ONDANSETRON 2 MG/ML
INJECTION INTRAMUSCULAR; INTRAVENOUS PRN
Status: DISCONTINUED | OUTPATIENT
Start: 2023-03-24 | End: 2023-03-24 | Stop reason: SDUPTHER

## 2023-03-24 RX ORDER — KETOROLAC TROMETHAMINE 30 MG/ML
INJECTION, SOLUTION INTRAMUSCULAR; INTRAVENOUS PRN
Status: DISCONTINUED | OUTPATIENT
Start: 2023-03-24 | End: 2023-03-24 | Stop reason: SDUPTHER

## 2023-03-24 RX ORDER — LIDOCAINE HYDROCHLORIDE 20 MG/ML
INJECTION, SOLUTION EPIDURAL; INFILTRATION; INTRACAUDAL; PERINEURAL PRN
Status: DISCONTINUED | OUTPATIENT
Start: 2023-03-24 | End: 2023-03-24 | Stop reason: SDUPTHER

## 2023-03-24 RX ORDER — SODIUM CHLORIDE, SODIUM LACTATE, POTASSIUM CHLORIDE, AND CALCIUM CHLORIDE .6; .31; .03; .02 G/100ML; G/100ML; G/100ML; G/100ML
IRRIGANT IRRIGATION
Status: COMPLETED | OUTPATIENT
Start: 2023-03-24 | End: 2023-03-24

## 2023-03-24 RX ORDER — ROCURONIUM BROMIDE 10 MG/ML
INJECTION, SOLUTION INTRAVENOUS PRN
Status: DISCONTINUED | OUTPATIENT
Start: 2023-03-24 | End: 2023-03-24 | Stop reason: SDUPTHER

## 2023-03-24 RX ORDER — LEVOFLOXACIN 5 MG/ML
500 INJECTION, SOLUTION INTRAVENOUS ONCE
Status: COMPLETED | OUTPATIENT
Start: 2023-03-24 | End: 2023-03-24

## 2023-03-24 RX ORDER — LIDOCAINE HYDROCHLORIDE 10 MG/ML
1 INJECTION, SOLUTION EPIDURAL; INFILTRATION; INTRACAUDAL; PERINEURAL
Status: DISCONTINUED | OUTPATIENT
Start: 2023-03-24 | End: 2023-03-24 | Stop reason: HOSPADM

## 2023-03-24 RX ORDER — HYDROMORPHONE HCL 110MG/55ML
0.5 PATIENT CONTROLLED ANALGESIA SYRINGE INTRAVENOUS EVERY 5 MIN PRN
Status: DISCONTINUED | OUTPATIENT
Start: 2023-03-24 | End: 2023-03-24 | Stop reason: HOSPADM

## 2023-03-24 RX ADMIN — KETOROLAC TROMETHAMINE 30 MG: 30 INJECTION, SOLUTION INTRAMUSCULAR at 16:11

## 2023-03-24 RX ADMIN — ROCURONIUM BROMIDE 10 MG: 10 INJECTION, SOLUTION INTRAVENOUS at 15:42

## 2023-03-24 RX ADMIN — METOCLOPRAMIDE 10 MG: 10 TABLET ORAL at 21:19

## 2023-03-24 RX ADMIN — PHENYLEPHRINE HYDROCHLORIDE 100 MCG: 10 INJECTION INTRAVENOUS at 15:55

## 2023-03-24 RX ADMIN — FENTANYL CITRATE 50 MCG: 50 INJECTION, SOLUTION INTRAMUSCULAR; INTRAVENOUS at 15:42

## 2023-03-24 RX ADMIN — POLYETHYLENE GLYCOL 3350 17 G: 17 POWDER, FOR SOLUTION ORAL at 21:19

## 2023-03-24 RX ADMIN — HYDROMORPHONE HYDROCHLORIDE 0.5 MG: 2 INJECTION, SOLUTION INTRAMUSCULAR; INTRAVENOUS; SUBCUTANEOUS at 16:54

## 2023-03-24 RX ADMIN — PROPOFOL 200 MG: 10 INJECTION, EMULSION INTRAVENOUS at 15:42

## 2023-03-24 RX ADMIN — MAGNESIUM SULFATE HEPTAHYDRATE 1 G: 500 INJECTION, SOLUTION INTRAMUSCULAR; INTRAVENOUS at 15:48

## 2023-03-24 RX ADMIN — SUGAMMADEX 200 MG: 100 INJECTION, SOLUTION INTRAVENOUS at 16:20

## 2023-03-24 RX ADMIN — Medication 100 MG: at 15:42

## 2023-03-24 RX ADMIN — ENOXAPARIN SODIUM 40 MG: 100 INJECTION SUBCUTANEOUS at 21:20

## 2023-03-24 RX ADMIN — OXYCODONE AND ACETAMINOPHEN 1 TABLET: 5; 325 TABLET ORAL at 18:04

## 2023-03-24 RX ADMIN — MIDAZOLAM 1 MG: 1 INJECTION INTRAMUSCULAR; INTRAVENOUS at 15:38

## 2023-03-24 RX ADMIN — LEVOFLOXACIN 500 MG: 5 INJECTION, SOLUTION INTRAVENOUS at 15:36

## 2023-03-24 RX ADMIN — LIDOCAINE HYDROCHLORIDE 100 MG: 20 INJECTION, SOLUTION EPIDURAL; INFILTRATION; INTRACAUDAL; PERINEURAL at 15:42

## 2023-03-24 RX ADMIN — DEXAMETHASONE SODIUM PHOSPHATE 8 MG: 4 INJECTION, SOLUTION INTRAMUSCULAR; INTRAVENOUS at 15:47

## 2023-03-24 RX ADMIN — MIDAZOLAM 1 MG: 1 INJECTION INTRAMUSCULAR; INTRAVENOUS at 15:31

## 2023-03-24 RX ADMIN — DIPHENHYDRAMINE HYDROCHLORIDE 12.5 MG: 50 INJECTION, SOLUTION INTRAMUSCULAR; INTRAVENOUS at 16:04

## 2023-03-24 RX ADMIN — OXYCODONE AND ACETAMINOPHEN 1 TABLET: 5; 325 TABLET ORAL at 22:24

## 2023-03-24 RX ADMIN — ROCURONIUM BROMIDE 30 MG: 10 INJECTION, SOLUTION INTRAVENOUS at 15:47

## 2023-03-24 RX ADMIN — DICYCLOMINE HYDROCHLORIDE 10 MG: 10 CAPSULE ORAL at 21:19

## 2023-03-24 RX ADMIN — HYDROMORPHONE HYDROCHLORIDE 0.5 MG: 1 INJECTION, SOLUTION INTRAMUSCULAR; INTRAVENOUS; SUBCUTANEOUS at 13:35

## 2023-03-24 RX ADMIN — SODIUM CHLORIDE, POTASSIUM CHLORIDE, SODIUM LACTATE AND CALCIUM CHLORIDE: 600; 310; 30; 20 INJECTION, SOLUTION INTRAVENOUS at 16:16

## 2023-03-24 RX ADMIN — ONDANSETRON 4 MG: 2 INJECTION INTRAMUSCULAR; INTRAVENOUS at 15:47

## 2023-03-24 RX ADMIN — HYDROMORPHONE HYDROCHLORIDE 0.5 MG: 1 INJECTION, SOLUTION INTRAMUSCULAR; INTRAVENOUS; SUBCUTANEOUS at 21:23

## 2023-03-24 RX ADMIN — HYDROMORPHONE HYDROCHLORIDE 0.5 MG: 1 INJECTION, SOLUTION INTRAMUSCULAR; INTRAVENOUS; SUBCUTANEOUS at 09:58

## 2023-03-24 RX ADMIN — FENTANYL CITRATE 50 MCG: 50 INJECTION, SOLUTION INTRAMUSCULAR; INTRAVENOUS at 16:28

## 2023-03-24 ASSESSMENT — PAIN SCALES - GENERAL
PAINLEVEL_OUTOF10: 9
PAINLEVEL_OUTOF10: 10
PAINLEVEL_OUTOF10: 7
PAINLEVEL_OUTOF10: 9
PAINLEVEL_OUTOF10: 9

## 2023-03-24 ASSESSMENT — PAIN - FUNCTIONAL ASSESSMENT
PAIN_FUNCTIONAL_ASSESSMENT: ACTIVITIES ARE NOT PREVENTED

## 2023-03-24 ASSESSMENT — PAIN DESCRIPTION - LOCATION
LOCATION: ABDOMEN
LOCATION: SHOULDER
LOCATION: ABDOMEN
LOCATION: SHOULDER
LOCATION: SHOULDER

## 2023-03-24 ASSESSMENT — PAIN DESCRIPTION - DESCRIPTORS
DESCRIPTORS: ACHING;DISCOMFORT
DESCRIPTORS: ACHING
DESCRIPTORS: DISCOMFORT
DESCRIPTORS: ACHING;DISCOMFORT
DESCRIPTORS: ACHING

## 2023-03-24 ASSESSMENT — PAIN DESCRIPTION - ORIENTATION
ORIENTATION: RIGHT

## 2023-03-24 ASSESSMENT — PAIN DESCRIPTION - PAIN TYPE: TYPE: ACUTE PAIN

## 2023-03-24 ASSESSMENT — LIFESTYLE VARIABLES: SMOKING_STATUS: 1

## 2023-03-24 NOTE — ANESTHESIA PRE PROCEDURE
CVA           GI/Hepatic/Renal:        (-) GERD      ROS comment: Denies nausea or vomiting today. Endo/Other:                      ROS comment: No FH of problems with GA Abdominal:             Vascular:     - DVT and PE. Other Findings:           Anesthesia Plan      general     ASA 2       Induction: intravenous and rapid sequence. MIPS: Postoperative opioids intended and Prophylactic antiemetics administered. Anesthetic plan and risks discussed with patient. Plan discussed with CRNA.                     Mana Cuellar MD   3/24/2023

## 2023-03-24 NOTE — ANESTHESIA POSTPROCEDURE EVALUATION
Department of Anesthesiology  Postprocedure Note    Patient: Perry Aguirre  MRN: [id number]  YOB: 1995  Date of evaluation: 3/24/2023      Procedure Summary     Date: 03/24/23 Room / Location: [address]    Anesthesia Start: 1536 Anesthesia Stop:     Procedure: CHOLECYSTECTOMY LAPAROSCOPIC WITH CHOLANGIOGRAM (Abdomen) Diagnosis:       Acute cholecystitis      (CHOLECYSTITIS)    Surgeons: Marco Pope MD Responsible Provider: Shahzad Barnes MD    Anesthesia Type: general ASA Status: 2          Anesthesia Type: No value filed.     Keren Phase I: Keren Score: 10    Keren Phase II:        Anesthesia Post Evaluation    Patient location during evaluation: PACU  Patient participation: complete - patient participated  Level of consciousness: awake and alert  Pain score: 2  Airway patency: patent  Nausea & Vomiting: no vomiting  Complications: no  Cardiovascular status: blood pressure returned to baseline  Respiratory status: acceptable  Hydration status: euvolemic  Multimodal analgesia pain management approach

## 2023-03-24 NOTE — BRIEF OP NOTE
Brief Postoperative Note      Patient: Daniel Bennett  YOB: 1995  MRN: [id number]    Date of Procedure: 3/24/2023    Pre-Op Diagnosis: CHOLECYSTITIS    Post-Op Diagnosis: Same       Procedure(s):  CHOLECYSTECTOMY LAPAROSCOPIC WITH CHOLANGIOGRAM    Surgeon(s):  Valery Rose MD    Assistant:  Surgical Assistant: Ameya Nguyen    Anesthesia: General    Estimated Blood Loss (mL): Minimal    Complications: None    Specimens:   ID Type Source Tests Collected by Time Destination   A : A) GALLBLADDER Tissue Gallbladder SURGICAL PATHOLOGY Valery Rose MD 3/24/2023 1611        Implants:  * No implants in log *      Drains:   [REMOVED] NG/OG/NJ/NE Tube Orogastric 18 fr Center mouth (Removed)       Findings: GB thickened and inflamed appearing with pericholecystic fluid. IOC normal.  Path pending. Resume diet post op.   Anticipate discharge in am     Electronically signed by Valery Rose MD on 3/24/2023 at 4:18 PM

## 2023-03-24 NOTE — DISCHARGE INSTRUCTIONS
Jhonathan Hunt M.D.    [phone] [facility] [address] [facility] [address] [facility]      POST-OPERATIVE INSTRUCTIONS FOR GALLBLADDER SURGERY    Call the office to schedule your post-operative appointment with your surgeon for two (2) weeks. You will have surgical glue closing your incisions. Your incisions are waterproof. You may shower. Wash incisions gently, and pat them dry. Do not rub your incisions. General guidelines for activity:   Avoid strenuous activity or lifting anything heavier than 15 pounds. It is OK to be up  walking around, and walking up and down stairs. Do what is comfortable: stop and rest when you feel tired. Drink plenty of fluids and stay on a bland diet for 2-3 days after surgery. Do not drive for three days and while taking your narcotic pain medicine. Watch for signs of infection:  Excessive warmth or bright redness around your incisions  Leakage of bloody or cloudy fluid from you incisions  Fever over 100.5    During the laparoscopic procedure that you had, gas is pumped into the abdominal cavity. You may feel abdominal, shoulder, or rib pain for a few days due to this gas. You will have pain medicine ordered. Take as directed    If you experience constipation:  Increase your water intake  Increase your activity, walking is best.  A stool softener or mild laxative may be necessary if you still have not had a bowel movement ; call the office for further instructions.

## 2023-03-24 NOTE — PROGRESS NOTES
Pt resting quietly in bed with eyes closed, awakens to voice, drowsy. VSS, O2 sats 100% on 3 L NC. Incisions to abdomen dry and intact, abdomen soft, ice pack remains in place. Pt seen by anesthesia, phase 1 criteria met. Will transfer pt to .

## 2023-03-24 NOTE — PROGRESS NOTES
1 patch TransDERmal Daily     Continuous Infusions:   sodium chloride      lactated ringers IV soln 75 mL/hr at 03/24/23 1412     PRN Meds:.sodium chloride flush, sodium chloride flush, sodium chloride, ondansetron **OR** ondansetron, acetaminophen **OR** acetaminophen, magnesium sulfate, potassium chloride **OR** potassium alternative oral replacement **OR** potassium chloride, HYDROmorphone      Assessment:  32 y.o. female admitted with   1. Biliary sludge    2. Gastric outlet obstruction        Abdominal pain, nausea, bloating  Constipation, chronic issue      Plan:  1. Pain about the same, but felt more in right upper quadrant today, nausea yesterday following intake without vomiting, small stools following enema, vitals/labs stable, mildly abnormal HIDA scan; continued supportive care, with continued right upper quadrant pain and mildly abnormal HIDA scan will proceed with laparoscopic cholecystectomy today with Dr. Daren Bynum  2. NPO; monitor bowel function  3. IV hydration; monitor and correct electrolytes  4. Perioperative antibiotics  5. Activity as tolerated  6. Pulmonary toilet, incentive spirometry  7. PRN analgesics and antiemetics--minimizing narcotics as tolerated  8. DVT prophylaxis with  Lovenox and SCD's  9. Management of medical comorbid etiologies per primary team and consulting services    EDUCATION:  Educated patient on plan of care and disease process--all questions answered. Plans discussed with patient and nursing. Reviewed and discussed with Dr. Daren Bynum. Signed:  AGNES Cooper CNP  3/24/2023 2:40 PM    Surgery Staff:     I have personally performed a face to face diagnostic evaluation on this patient. I have interviewed and examined the patient and I agree with the assessment above.  Time was spent reviewing patient chart (including but not limited to notes, labs, imaging and other testing), interviewing and counseling patient and present family members, performing physical

## 2023-03-24 NOTE — PROGRESS NOTES
Growth percentiles are based on CDC (Girls, 2-20 Years) data. General appearance:  Appears comfortable  Eyes: Sclera clear. Pupils equal.  ENT: Moist oral mucosa. Trachea midline, no adenopathy. Cardiovascular: Regular rhythm, normal S1, S2. No murmur. No edema in lower extremities  Respiratory: Not using accessory muscles. Good inspiratory effort. Clear to auscultation bilaterally, no wheeze or crackles. GI: Abdomen soft, no tenderness, not distended, normal bowel sounds  Musculoskeletal: No cyanosis in digits, neck supple  Neurology: CN 2-12 grossly intact. No speech or motor deficits  Psych: Normal affect. Alert and oriented in time, place and person  Skin: Warm, dry, normal turgor    Labs and Tests:  CBC:   Recent Labs     03/22/23  0518 03/23/23  0557 03/24/23  0506   WBC 6.5 5.2 5.9   HGB 11.9* 10.8* 11.6*    110* 157       BMP:    Recent Labs     03/22/23  0518 03/23/23  0557 03/24/23  0506    142 138   K 3.9 3.7 3.5    108 105   CO2 23 26 26   BUN 10 7 9   CREATININE 0.6 0.7 0.8   GLUCOSE 136* 81 90       Hepatic:   Recent Labs     03/22/23  0518 03/24/23  0506   AST 25 33   ALT 22 40   BILITOT <0.2 0.3   ALKPHOS 57 51         CT abd with iv contrast at [facility] Foods: Severely distended stomach containing food and air-suggestive of GOO, Has left sided varicocele-previous left ovarian vein embolization. CT abd/pelvis 3/22  Bilateral lower lobe atelectatic changes. No evidence of bowel obstruction,   perforation or thickening. Mild constipation. No evidence of stomach outlet   obstruction. Gallbladder sludge but no obvious thickening. Trace of free fluid in the   cul-de-sac. No evidence of ascites or abscess.        EGD  Impression: Patient does not have a gastric outlet obstruction  Patient may have a slow stomach  There is the possibility that she has a gastritis and a the eosinophilic esophagitis will need to wait the biopsies        Recommendations: Short-term she will need a proton pump inhibitor and a prokinetic agent    Problem List  Principal Problem:    Gastric outlet obstruction  Active Problems:    RUQ abdominal pain    Biliary sludge  Resolved Problems:    * No resolved hospital problems. *       Assessment & Plan:   Biliary dyskinesia-has been having Nausea and bloating-had outpatient [facility] at [facility] that suggested GOO. EGD was neg for GOO but revealed gastritis, small HH. She did have a repeat CT suggestive of GB sludge. LFTs ok. HIDA scan with slightly EF below normal. Patient wants to pursue with lap agustin. She is aware this may not cure her symptoms. GI has started reglan. Discussed side effects and possible tardive dyskinesia which could become permanent with patient. Constipation-suggest this is etiology of LLQ pain. Could have some pelvic floor dysfunction/prolapse as well. Has had lactulose and multiple enemas with improvement.       Diet: Diet NPO Exceptions are: Sips of Water with Meds  Code:Full Code  DVT PPX: carlos Silverman PA-C   3/24/2023 1:26 PM

## 2023-03-24 NOTE — PROGRESS NOTES
Gastroenterology Progress Note      Naveed Bhakta is a 32 y.o. female patient. 1. Gastric outlet obstruction        SUBJECTIVE:  Feels ok today. NPO for agustin. Physical    VITALS:  /79   Pulse 76   Temp 98.6 °F (37 °C) (Oral)   Resp 16   Ht 5' 2\" (1.575 m)   Wt 173 lb (78.5 kg)   LMP 2023 (Approximate)   SpO2 97%   BMI 31.64 kg/m²   TEMPERATURE:  Current - Temp: 98.6 °F (37 °C); Max - Temp  Av.4 °F (36.9 °C)  Min: 97.8 °F (36.6 °C)  Max: 98.6 °F (37 °C)    Constitutional: Alert and oriented x 4. No acute distress. Respiratory: Respirations nonlabored, no crepitus  GI: Abdomen nondistended, soft, and nontender. Neurological: No focal deficits noted. No asterixis. Data    Data Review:    Recent Labs     23  0518 23  0557 23  0506   WBC 6.5 5.2 5.9   HGB 11.9* 10.8* 11.6*   HCT 35.4* 32.2* 33.3*   MCV 89.5 89.4 88.8    110* 157       Recent Labs     23  0518 23  0557 23  0506    142 138   K 3.9 3.7 3.5    108 105   CO2 23 26 26   PHOS 2.6 2.8  --    BUN 10 7 9   CREATININE 0.6 0.7 0.8       Recent Labs     23  0518 23  0506   AST 25 33   ALT 22 40   BILIDIR <0.2  --    BILITOT <0.2 0.3   ALKPHOS 56 49       Recent Labs     23  0506   LIPASE 22.0     No results for input(s): PROTIME, INR in the last 72 hours. No results for input(s): PTT in the last 72 hours. ASSESSMENT / PLAN      Postprandial fullness, nausea - for several months. Had EGD at outside ED and it showed gastric distention concerning for GOO. EGD 3/21 was ok, no GOO. Biopsies of the esophagus, stomach and duodenum were ok. May have gastroparesis. Started on PPI and Reglan. Imaging c/w gallstones.  HIDA with mildly low EF and CCK did reproduce symptoms.   - PPI  - Reglan  - possible agustin per surgery     Chronic constipation   - Linzess    Discussed with Dr. Zafar Pineda, PA-C  [address]     This a very pleasant 64-year-old female who is seen at the bedside today with our certified physicians assistant    Patient continues to have problems with her abdominal pain right upper quadrant pain she did have a normal EGD she did have an abnormal HIDA scan    Is going for a laparoscopic cholecystectomy later today    Rory Wick

## 2023-03-24 NOTE — PROGRESS NOTES
Pt sleeping roused easily to voice. Discussed that HCG urine is required and placed . VSS. States she feels \"about the same as yesterday. \" Remains NPO. Pt is axox4 and able to answer questions and follow commands throughout assessment.

## 2023-03-24 NOTE — PROGRESS NOTES
Pt arrived from OR to PACU, arouses to stimuli. VSS, O2 sats 100% on 6 L simple mask. Incisions to abdomen dry and intact, abdomen soft, ice pack in place. Will monitor.

## 2023-03-25 ENCOUNTER — APPOINTMENT (OUTPATIENT)
Dept: CT IMAGING | Age: 28
DRG: 263 | End: 2023-03-25
Attending: STUDENT IN AN ORGANIZED HEALTH CARE EDUCATION/TRAINING PROGRAM
Payer: MEDICAID

## 2023-03-25 VITALS
OXYGEN SATURATION: 97 % | BODY MASS INDEX: 31.83 KG/M2 | DIASTOLIC BLOOD PRESSURE: 90 MMHG | RESPIRATION RATE: 18 BRPM | HEIGHT: 62 IN | SYSTOLIC BLOOD PRESSURE: 143 MMHG | HEART RATE: 77 BPM | TEMPERATURE: 98.2 F | WEIGHT: 173 LBS

## 2023-03-25 LAB
ALBUMIN SERPL-MCNC: 4.2 G/DL (ref 3.4–5)
ALBUMIN/GLOB SERPL: 1.4 {RATIO} (ref 1.1–2.2)
ALP SERPL-CCNC: 73 U/L (ref 40–129)
ALT SERPL-CCNC: 173 U/L (ref 10–40)
ANION GAP SERPL CALCULATED.3IONS-SCNC: 11 MMOL/L (ref 3–16)
AST SERPL-CCNC: 154 U/L (ref 15–37)
BASOPHILS # BLD: 0 K/UL (ref 0–0.2)
BASOPHILS NFR BLD: 0.1 %
BILIRUB SERPL-MCNC: 0.4 MG/DL (ref 0–1)
BUN SERPL-MCNC: 7 MG/DL (ref 7–20)
CALCIUM SERPL-MCNC: 9.2 MG/DL (ref 8.3–10.6)
CHLORIDE SERPL-SCNC: 106 MMOL/L (ref 99–110)
CO2 SERPL-SCNC: 22 MMOL/L (ref 21–32)
CREAT SERPL-MCNC: 0.8 MG/DL (ref 0.6–1.1)
DEPRECATED RDW RBC AUTO: 13.3 % (ref 12.4–15.4)
EOSINOPHIL # BLD: 0 K/UL (ref 0–0.6)
EOSINOPHIL NFR BLD: 0.1 %
GFR SERPLBLD CREATININE-BSD FMLA CKD-EPI: >60 ML/MIN/{1.73_M2}
GLUCOSE SERPL-MCNC: 126 MG/DL (ref 70–99)
HCT VFR BLD AUTO: 38 % (ref 36–48)
HGB BLD-MCNC: 12.8 G/DL (ref 12–16)
LYMPHOCYTES # BLD: 1.2 K/UL (ref 1–5.1)
LYMPHOCYTES NFR BLD: 11 %
MCH RBC QN AUTO: 30.4 PG (ref 26–34)
MCHC RBC AUTO-ENTMCNC: 33.6 G/DL (ref 31–36)
MCV RBC AUTO: 90.7 FL (ref 80–100)
MONOCYTES # BLD: 0.5 K/UL (ref 0–1.3)
MONOCYTES NFR BLD: 5 %
NEUTROPHILS # BLD: 9 K/UL (ref 1.7–7.7)
NEUTROPHILS NFR BLD: 83.8 %
PLATELET # BLD AUTO: 191 K/UL (ref 135–450)
PMV BLD AUTO: 8.7 FL (ref 5–10.5)
POTASSIUM SERPL-SCNC: 3.7 MMOL/L (ref 3.5–5.1)
PROT SERPL-MCNC: 7.3 G/DL (ref 6.4–8.2)
RBC # BLD AUTO: 4.19 M/UL (ref 4–5.2)
SODIUM SERPL-SCNC: 139 MMOL/L (ref 136–145)
WBC # BLD AUTO: 10.8 K/UL (ref 4–11)

## 2023-03-25 PROCEDURE — 74160 CT ABDOMEN W/CONTRAST: CPT

## 2023-03-25 PROCEDURE — APPNB30 APP NON BILLABLE TIME 0-30 MINS: Performed by: NURSE PRACTITIONER

## 2023-03-25 PROCEDURE — 6370000000 HC RX 637 (ALT 250 FOR IP): Performed by: SURGERY

## 2023-03-25 PROCEDURE — 80053 COMPREHEN METABOLIC PANEL: CPT

## 2023-03-25 PROCEDURE — 6360000004 HC RX CONTRAST MEDICATION: Performed by: INTERNAL MEDICINE

## 2023-03-25 PROCEDURE — 85025 COMPLETE CBC W/AUTO DIFF WBC: CPT

## 2023-03-25 PROCEDURE — APPSS15 APP SPLIT SHARED TIME 0-15 MINUTES: Performed by: NURSE PRACTITIONER

## 2023-03-25 PROCEDURE — 99024 POSTOP FOLLOW-UP VISIT: CPT | Performed by: SURGERY

## 2023-03-25 PROCEDURE — 6360000002 HC RX W HCPCS: Performed by: SURGERY

## 2023-03-25 PROCEDURE — 36415 COLL VENOUS BLD VENIPUNCTURE: CPT

## 2023-03-25 RX ORDER — HYDROCODONE BITARTRATE AND ACETAMINOPHEN 5; 325 MG/1; MG/1
1 TABLET ORAL EVERY 6 HOURS PRN
Qty: 12 TABLET | Refills: 0 | Status: SHIPPED | OUTPATIENT
Start: 2023-03-25 | End: 2023-03-28

## 2023-03-25 RX ADMIN — HYDROMORPHONE HYDROCHLORIDE 0.5 MG: 1 INJECTION, SOLUTION INTRAMUSCULAR; INTRAVENOUS; SUBCUTANEOUS at 15:53

## 2023-03-25 RX ADMIN — DICYCLOMINE HYDROCHLORIDE 10 MG: 10 CAPSULE ORAL at 09:10

## 2023-03-25 RX ADMIN — PANTOPRAZOLE SODIUM 40 MG: 40 TABLET, DELAYED RELEASE ORAL at 06:25

## 2023-03-25 RX ADMIN — HYDROMORPHONE HYDROCHLORIDE 0.5 MG: 1 INJECTION, SOLUTION INTRAMUSCULAR; INTRAVENOUS; SUBCUTANEOUS at 01:44

## 2023-03-25 RX ADMIN — OXYCODONE AND ACETAMINOPHEN 1 TABLET: 5; 325 TABLET ORAL at 12:37

## 2023-03-25 RX ADMIN — POLYETHYLENE GLYCOL 3350 17 G: 17 POWDER, FOR SOLUTION ORAL at 09:11

## 2023-03-25 RX ADMIN — OXYCODONE AND ACETAMINOPHEN 1 TABLET: 5; 325 TABLET ORAL at 09:10

## 2023-03-25 RX ADMIN — LINACLOTIDE 145 MCG: 145 CAPSULE, GELATIN COATED ORAL at 06:25

## 2023-03-25 RX ADMIN — OXYCODONE AND ACETAMINOPHEN 1 TABLET: 5; 325 TABLET ORAL at 03:45

## 2023-03-25 RX ADMIN — METOCLOPRAMIDE 10 MG: 10 TABLET ORAL at 09:10

## 2023-03-25 RX ADMIN — IOPAMIDOL 75 ML: 755 INJECTION, SOLUTION INTRAVENOUS at 14:06

## 2023-03-25 ASSESSMENT — PAIN SCALES - GENERAL
PAINLEVEL_OUTOF10: 4
PAINLEVEL_OUTOF10: 7

## 2023-03-25 ASSESSMENT — PAIN DESCRIPTION - DESCRIPTORS
DESCRIPTORS: DISCOMFORT
DESCRIPTORS: DISCOMFORT

## 2023-03-25 ASSESSMENT — PAIN DESCRIPTION - LOCATION
LOCATION: SHOULDER

## 2023-03-25 ASSESSMENT — PAIN - FUNCTIONAL ASSESSMENT
PAIN_FUNCTIONAL_ASSESSMENT: ACTIVITIES ARE NOT PREVENTED
PAIN_FUNCTIONAL_ASSESSMENT: ACTIVITIES ARE NOT PREVENTED

## 2023-03-25 ASSESSMENT — PAIN DESCRIPTION - ORIENTATION
ORIENTATION: RIGHT

## 2023-03-25 ASSESSMENT — PAIN DESCRIPTION - ONSET: ONSET: ON-GOING

## 2023-03-25 ASSESSMENT — PAIN DESCRIPTION - PAIN TYPE: TYPE: ACUTE PAIN

## 2023-03-25 ASSESSMENT — PAIN DESCRIPTION - FREQUENCY: FREQUENCY: CONTINUOUS

## 2023-03-25 NOTE — PROGRESS NOTES
linaclotide  145 mcg Oral QAM AC    polyethylene glycol  17 g Oral BID    dicyclomine  10 mg Oral Q12H    sodium chloride flush  5-40 mL IntraVENous 2 times per day    enoxaparin  40 mg SubCUTAneous Nightly    pantoprazole  40 mg Oral QAM AC    metoclopramide  10 mg Oral BID    nicotine  1 patch TransDERmal Daily     Continuous Infusions:   sodium chloride      sodium chloride      lactated ringers IV soln 75 mL/hr at 03/24/23 1536     PRN Meds:.oxyCODONE-acetaminophen, acetaminophen, HYDROmorphone, sodium chloride flush, sodium chloride flush, sodium chloride, ondansetron **OR** ondansetron, magnesium sulfate, potassium chloride **OR** potassium alternative oral replacement **OR** potassium chloride      Assessment:  32 y.o. female admitted with   1. Biliary sludge    2. Gastric outlet obstruction    3. Acute cholecystitis        OR Date 3/24/2023, laparoscopic cholecystectomy with intraoperative cholangiogram for biliary sludge, biliary dyskinesia, right upper quadrant pain and possible chronic cholecystitis  Constipation, chronic issue      Plan:  1. Pain controlled, no further nausea, no vomiting, tolerating regular diet, passing stool, vitals stable; continued supportive care, no further imaging or intervention planned  2. Regular diet as tolerated; monitor bowel function  3. Stop IV hydration  4. Activity as tolerated, ambulate TID, up to chair for all meals  5. Pulmonary toilet, incentive spirometry  6. PRN analgesics and antiemetics--minimizing narcotics as tolerated, transition to PO  7. DVT prophylaxis with  Lovenox and SCD's  8. Management of medical comorbid etiologies per primary team and consulting services  9. Disposition: Okay for discharge home from a surgical perspective; follow up with Dr. Rian Perez in 2 weeks    EDUCATION:  Educated patient on plan of care and disease process--all questions answered. Plans discussed with patient, nursing, and [facility].   Reviewed and discussed with

## 2023-03-25 NOTE — DISCHARGE SUMMARY
as Reglan and Bentyl by GI for possibility of delayed gastric emptying. Consults. IP CONSULT TO GI  IP CONSULT TO GENERAL SURGERY    Physical examination on discharge day. BP (!) 143/90   Pulse 77   Temp 98.2 °F (36.8 °C) (Oral)   Resp 18   Ht 5' 2\" (1.575 m)   Wt 173 lb (78.5 kg)   LMP 03/14/2023 (Approximate)   SpO2 97%   BMI 31.64 kg/m²   General appearance. Alert. Looks comfortable. HEENT. Sclera clear. Moist mucus membranes. Cardiovascular. Regular rate and rhythm, normal S1, S2. No murmur. Respiratory. Not using accessory muscles. Clear to auscultation bilaterally, no wheeze. Gastrointestinal. Abdomen soft, non-tender, not distended, normal bowel sounds  Neurology. Facial symmetry. No speech deficits. Moving all extremities equally. Extremities. No edema in lower extremities. Skin. Warm, dry, normal turgor    Condition at time of discharge stable    Medication instructions provided to patient at discharge. Medication List        START taking these medications      dicyclomine 10 MG capsule  Commonly known as: BENTYL  Take 1 capsule by mouth every 12 hours     HYDROcodone-acetaminophen 5-325 MG per tablet  Commonly known as: Norco  Take 1 tablet by mouth every 6 hours as needed for Pain for up to 3 days. Intended supply: 3 days.  Take lowest dose possible to manage pain Max Daily Amount: 4 tablets     linaclotide 145 MCG capsule  Commonly known as: LINZESS  Take 1 capsule by mouth every morning (before breakfast)     metoclopramide 10 MG tablet  Commonly known as: REGLAN  Take 1 tablet by mouth 2 times daily     pantoprazole 40 MG tablet  Commonly known as: PROTONIX  Take 1 tablet by mouth every morning (before breakfast)     polyethylene glycol 17 g packet  Commonly known as: GLYCOLAX  Take 17 g by mouth 2 times daily            CONTINUE taking these medications      Nurtec 75 MG Tbdp  Generic drug: Rimegepant Sulfate               Where to Get Your Medications        These medications were sent to [facility], [address] [address]      Phone: [phone]   dicyclomine 10 MG capsule  metoclopramide 10 MG tablet  pantoprazole 40 MG tablet  polyethylene glycol 17 g packet       These medications were sent to [address] [place] [address], [address] [facility] [phone] [place] [phone]  [facility] [address] [place] [address] [facility]      Phone: [phone]   HYDROcodone-acetaminophen 5-325 MG per tablet  linaclotide 145 MCG capsule         Discharge recommendations given to patient. Follow Up. Dr Sukhjinder Ye in 2 weeks, GI in one month   Disposition. home  Activity. activity as tolerated  Diet: ADULT DIET; Regular      Spent 33 minutes in discharge process. Signed:   Svetlana Pyle PA-C     3/25/2023 9:52 AM

## 2023-03-25 NOTE — PLAN OF CARE
Problem: Discharge Planning  Goal: Discharge to home or other facility with appropriate resources  Outcome: Progressing  Flowsheets (Taken 3/25/2023 0900)  Discharge to home or other facility with appropriate resources:   Identify barriers to discharge with patient and caregiver   Arrange for needed discharge resources and transportation as appropriate   Identify discharge learning needs (meds, wound care, etc)   Refer to discharge planning if patient needs post-hospital services based on physician order or complex needs related to functional status, cognitive ability or social support system     Problem: Pain  Goal: Verbalizes/displays adequate comfort level or baseline comfort level  Outcome: Progressing     Problem: ABCDS Injury Assessment  Goal: Absence of physical injury  Outcome: Progressing     Problem: Safety - Adult  Goal: Free from fall injury  Outcome: Progressing

## 2023-03-25 NOTE — PROGRESS NOTES
[facility]    3/23/2023 1:27 PM        Name: Tanya Yarbrough . Admitted: 3/21/2023  Primary Care Provider: Мария Pendleton (Tel: [phone])      Subjective:  . Patient seen this am. Constipation resolved. Still with RUQ pain.  HIDA slightly abnormal    Reviewed interval ancillary notes    Current Medications  iopamidol (ISOVUE-370) 76 % injection 75 mL, ONCE PRN  0.9 % sodium chloride infusion, Continuous  oxyCODONE-acetaminophen (PERCOCET) 5-325 MG per tablet 1 tablet, Q4H PRN  acetaminophen (TYLENOL) tablet 1,000 mg, Q6H PRN  HYDROmorphone HCl PF (DILAUDID) injection 0.5 mg, Q2H PRN  sodium chloride flush 0.9 % injection 10 mL, PRN  linaclotide (LINZESS) capsule 145 mcg, QAM AC  polyethylene glycol (GLYCOLAX) packet 17 g, BID  dicyclomine (BENTYL) capsule 10 mg, Q12H  sodium chloride flush 0.9 % injection 5-40 mL, 2 times per day  sodium chloride flush 0.9 % injection 5-40 mL, PRN  0.9 % sodium chloride infusion, PRN  enoxaparin (LOVENOX) injection 40 mg, Nightly  ondansetron (ZOFRAN-ODT) disintegrating tablet 4 mg, Q8H PRN   Or  ondansetron (ZOFRAN) injection 4 mg, Q6H PRN  lactated ringers IV soln infusion, Continuous  magnesium sulfate 2000 mg in 50 mL IVPB premix, PRN  potassium chloride (KLOR-CON M) extended release tablet 40 mEq, PRN   Or  potassium bicarb-citric acid (EFFER-K) effervescent tablet 40 mEq, PRN   Or  potassium chloride 10 mEq/100 mL IVPB (Peripheral Line), PRN  pantoprazole (PROTONIX) tablet 40 mg, QAM AC  metoclopramide (REGLAN) tablet 10 mg, BID  nicotine (NICODERM CQ) 21 MG/24HR 1 patch, Daily      Objective:  BP (!) 143/90   Pulse 77   Temp 98.2 °F (36.8 °C) (Oral)   Resp 18   Ht 5' 2\" (1.575 m)   Wt 173 lb (78.5 kg)   LMP 03/14/2023 (Approximate)   SpO2 97%   BMI 31.64 kg/m²     Intake/Output Summary (Last 24 hours) at 3/25/2023 1324  Last data filed at

## 2023-03-25 NOTE — PROGRESS NOTES
Shift assessment complete, VSS, A&Ox4. Morning medications administered per MAR. Patient states pain of 7/10, pain medication administered per MAR. Patient stating pain is in right shoulder but feels like it's gas pain. Patient states no further needs at this time. Call light and personal belongings within reach. 9:30 AM  Patient's pain re-evaluated, 4/10.     2:28 PM  Patient back to floor from CT scan with nurse. Awaiting results and for it to be read by doctors.

## 2023-03-25 NOTE — PROGRESS NOTES
Gastroenterology Progress Note      Jeb Armstrong is a 32 y.o. female patient. Principal Problem:    Gastric outlet obstruction  Active Problems:    RUQ abdominal pain    Biliary sludge  Resolved Problems:    * No resolved hospital problems. *      SUBJECTIVE: Patient is complaining of right shoulder pain after her laparoscopic cholecystectomy    Does feel better from 1 aspect which this pain is causing her to have problems    ROS:  No fever, chills  No chest pain, palpitations  No SOB, cough  Gastrointestinal ROS: no abdominal pain, nausea, vomiting     Physical    VITALS:  BP (!) 143/90   Pulse 77   Temp 98.2 °F (36.8 °C) (Oral)   Resp 18   Ht 5' 2\" (1.575 m)   Wt 173 lb (78.5 kg)   LMP 2023 (Approximate)   SpO2 97%   BMI 31.64 kg/m²   TEMPERATURE:  Current - Temp: 98.2 °F (36.8 °C); Max - Temp  Av.9 °F (36.6 °C)  Min: 96.9 °F (36.1 °C)  Max: 98.6 °F (37 °C)    NAD  RRR, Nl s1s2  Lungs CTA Bilaterally, normal effort  Abdomen soft, ND, NT, no HSM, Bowel sounds normal.    Data    Data Review:    Recent Labs     23  0557 23  0506 23  0949   WBC 5.2 5.9 10.8   HGB 10.8* 11.6* 12.8   HCT 32.2* 33.3* 38.0   MCV 89.4 88.8 90.7   * 157 191     Recent Labs     23  0557 23  0506 23  0949    138 139   K 3.7 3.5 3.7    105 106   CO2 26 26 22   PHOS 2.8  --   --    BUN 7 9 7   CREATININE 0.7 0.8 0.8     Recent Labs     23  0506 23  0949   AST 33 154*   ALT 40 173*   BILITOT 0.3 0.4   ALKPHOS 49 73     Recent Labs     23  0506   LIPASE 22.0     No results for input(s): PROTIME, INR in the last 72 hours. No results for input(s): PTT in the last 72 hours.     Radiology Review:        ASSESSMENT right shoulder pain after lap agustin liver enzymes have gone up 4 fold to 5 fold overnight  I am concerned that the patient could have a bile leak  I will order a stat CT scan to look for fluid around the liver or in the gallbladder graciela Wu

## 2023-03-25 NOTE — OP NOTE
was brought to the operating  room, placed on operative table in supine position. Compression boots  were placed. General anesthetic was administered. The abdomen was  prepped and draped sterilely and time-out was done. Infraumbilical 5-mm  direct entry port was placed and the abdominal cavity was insufflated to  15 mmHg pressure. Upper abdominal 5-mm port and two right lateral  abdominal 5-mm ports were placed under direct vision. The gallbladder  was identified, grasped superior at the fundus. The gallbladder did  appear thickened, inflamed and there were pericholecystic adhesions and  induration down around the area of the infundibulum. The infundibulum  was dissected cleanly and retracted out inferolaterally to the right. Triangle of Calot was visualized. The critical angle view technique was  used to visualize cystic artery, cystic duct and node of Calot. Couple  of small vessels and lymphatics were clipped with single clips and the  cystic artery was clipped with two clips distally and one clip  proximally and divided. The cyst duct gallbladder junction was clipped  and partially transected. The intraoperative catheter was passed  through its Angiocath in the intra-abdominal wall and cholangiogram was  obtained. This appeared normal.  The cholangiogram catheter was  removed. The cystic duct was clipped with three clips proximally and  the transection of the duct was completed. Bovie electrocautery was  used to remove the gallbladder from the gallbladder fossa. The clips  were then removed. The gallbladder was suction aspirated. It was  removed out the epigastric 5-mm port site. Perihepatic area was  irrigated and aspirated dry. The gallbladder bed was hemostatic. The  clips were good location with no bleeding or bile leak noted. The  instruments and ports were removed. The port sites appeared hemostatic. The patient's skin was closed at all sites with 4-0 Monocryl suture.    Dermabond glue was placed. The patient was then taken to recovery room  in stable condition postop.         Ashlyn Walters MD    D: 03/24/2023 16:38:12       T: 03/24/2023 16:42:22     [email or web address]  Job#: [id number]     Doc#: [id number]    CC:

## 2023-04-05 DIAGNOSIS — R10.11 RIGHT UPPER QUADRANT PAIN: Primary | ICD-10-CM

## 2023-04-05 RX ORDER — TRAMADOL HYDROCHLORIDE 50 MG/1
50 TABLET ORAL EVERY 8 HOURS PRN
Qty: 21 TABLET | Refills: 0 | Status: SHIPPED | OUTPATIENT
Start: 2023-04-05 | End: 2023-04-12

## (undated) DEVICE — C-ARM: Brand: UNBRANDED

## (undated) DEVICE — ENDOSCOPIC KIT 6X3/16 FT COLON W/ 1.1 OZ 2 GWN W/O BRSH

## (undated) DEVICE — SYRINGE MED 30ML STD CLR PLAS LUERLOCK TIP N CTRL DISP

## (undated) DEVICE — SOLUTION IV IRRIG WATER 500ML POUR BRL ST 2F7113

## (undated) DEVICE — CORD ES L10FT MPLR LAP

## (undated) DEVICE — Device

## (undated) DEVICE — ELECTRODE PT RET AD L9FT HI MOIST COND ADH HYDRGEL CORDED

## (undated) DEVICE — SHEET,DRAPE,53X77,STERILE: Brand: MEDLINE

## (undated) DEVICE — APPLICATOR MEDICATED 26 CC SOLUTION HI LT ORNG CHLORAPREP

## (undated) DEVICE — HYPODERMIC SAFETY NEEDLE: Brand: MAGELLAN

## (undated) DEVICE — MERCY FAIRFIELD TURNOVER KIT: Brand: MEDLINE INDUSTRIES, INC.

## (undated) DEVICE — TROCAR SLEEVE: Brand: KII ® LOW PROFILE SLEEVE

## (undated) DEVICE — LAPAROSCOPY PACK: Brand: MEDLINE INDUSTRIES, INC.

## (undated) DEVICE — INDICATED FOR USE DURING OPEN AND LAPAROSCOPIC CHOLECYSTECTOMY PROCEDURES TO INJECT RADIOPAQUE MEDIA THROUGH THE CYSTIC DUCT INTO THE BILIARY TREE.: Brand: AEROSTAT®

## (undated) DEVICE — APPLIER CLP M/L SHFT DIA5MM 15 LIG LIGAMAX 5

## (undated) DEVICE — SYRINGE MED 10ML TRNSLUC BRL PLUNG BLK MRK POLYPR CTRL

## (undated) DEVICE — GOWN SIRUS NONREIN LG W/TWL: Brand: MEDLINE INDUSTRIES, INC.

## (undated) DEVICE — SOLUTION IRRIG 1000ML 0.9% SOD CHL USP POUR PLAS BTL

## (undated) DEVICE — ADHESIVE SKIN CLSR 0.7ML TOP DERMBND ADV

## (undated) DEVICE — MOUTHPIECE ENDOSCP L CTRL OPN AND SIDE PORTS DISP

## (undated) DEVICE — GLOVE SURG SZ 6.5 L11.2IN FNGR THK9.8MIL STRW LTX POLYMER

## (undated) DEVICE — BW-412T DISP COMBO CLEANING BRUSH: Brand: SINGLE USE COMBINATION CLEANING BRUSH

## (undated) DEVICE — DRAPE,LAP,CHOLE,W/TROUGHS,STERILE: Brand: MEDLINE

## (undated) DEVICE — LAPAROSCOPIC TROCAR SLEEVE/SINGLE USE: Brand: KII® LOW PROFILE OPTICAL ACCESS SYSTEM

## (undated) DEVICE — AIR/WATER CLEANING ADAPTER FOR OLYMPUS® GI ENDOSCOPE: Brand: BULLDOG®

## (undated) DEVICE — SUTURE MCRYL + SZ 4-0 L27IN ABSRB UD L19MM PS-2 3/8 CIR MCP426H

## (undated) DEVICE — VALVE SUCTION AIR H2O SET ORCA POD + DISP

## (undated) DEVICE — FORCEPS BX L240CM WRK CHN 2.8MM STD CAP W/ NDL MIC MESH